# Patient Record
Sex: MALE | Race: WHITE | NOT HISPANIC OR LATINO | Employment: FULL TIME | URBAN - NONMETROPOLITAN AREA
[De-identification: names, ages, dates, MRNs, and addresses within clinical notes are randomized per-mention and may not be internally consistent; named-entity substitution may affect disease eponyms.]

---

## 2017-03-27 ENCOUNTER — COMMUNICATION - GICH (OUTPATIENT)
Dept: INTERNAL MEDICINE | Facility: OTHER | Age: 47
End: 2017-03-27

## 2017-03-27 DIAGNOSIS — E03.9 HYPOTHYROIDISM: ICD-10-CM

## 2017-04-17 ENCOUNTER — OFFICE VISIT - GICH (OUTPATIENT)
Dept: INTERNAL MEDICINE | Facility: OTHER | Age: 47
End: 2017-04-17

## 2017-04-17 ENCOUNTER — HISTORY (OUTPATIENT)
Dept: INTERNAL MEDICINE | Facility: OTHER | Age: 47
End: 2017-04-17

## 2017-04-17 DIAGNOSIS — E78.5 HYPERLIPIDEMIA: ICD-10-CM

## 2017-04-17 DIAGNOSIS — M62.9 DISORDER OF MUSCLE: ICD-10-CM

## 2017-04-17 DIAGNOSIS — M25.561 PAIN IN RIGHT KNEE: ICD-10-CM

## 2017-04-17 DIAGNOSIS — R79.89 OTHER SPECIFIED ABNORMAL FINDINGS OF BLOOD CHEMISTRY: ICD-10-CM

## 2017-04-17 DIAGNOSIS — E03.9 HYPOTHYROIDISM: ICD-10-CM

## 2017-04-17 DIAGNOSIS — E78.2 MIXED HYPERLIPIDEMIA: ICD-10-CM

## 2017-04-17 DIAGNOSIS — Z82.49 FAMILY HISTORY OF ISCHEMIC HEART DISEASE AND OTHER DISEASES OF THE CIRCULATORY SYSTEM: ICD-10-CM

## 2017-04-17 DIAGNOSIS — R35.1 NOCTURIA: ICD-10-CM

## 2017-04-17 DIAGNOSIS — R73.09 OTHER ABNORMAL GLUCOSE: ICD-10-CM

## 2017-04-17 DIAGNOSIS — Z00.00 ENCOUNTER FOR GENERAL ADULT MEDICAL EXAMINATION WITHOUT ABNORMAL FINDINGS: ICD-10-CM

## 2017-04-17 DIAGNOSIS — E03.4 ACQUIRED ATROPHY OF THYROID: ICD-10-CM

## 2017-04-17 LAB
A/G RATIO - HISTORICAL: 2.1 (ref 1–2)
ALBUMIN SERPL-MCNC: 4.6 G/DL (ref 3.5–5.7)
ALP SERPL-CCNC: 48 IU/L (ref 34–104)
ALT (SGPT) - HISTORICAL: 27 IU/L (ref 7–52)
ANION GAP - HISTORICAL: 6 (ref 5–18)
AST SERPL-CCNC: 22 IU/L (ref 13–39)
BILIRUB SERPL-MCNC: 0.6 MG/DL (ref 0.3–1)
BUN SERPL-MCNC: 15 MG/DL (ref 7–25)
BUN/CREAT RATIO - HISTORICAL: 16
CALCIUM SERPL-MCNC: 9.3 MG/DL (ref 8.6–10.3)
CHLORIDE SERPLBLD-SCNC: 103 MMOL/L (ref 98–107)
CHOL/HDL RATIO - HISTORICAL: 6.88
CHOLESTEROL TOTAL: 227 MG/DL
CO2 SERPL-SCNC: 30 MMOL/L (ref 21–31)
CREAT SERPL-MCNC: 0.96 MG/DL (ref 0.7–1.3)
CRP-ULTRASENSITIVE: 0.78 MG/L (ref 0.2–160)
ERYTHROCYTE [DISTWIDTH] IN BLOOD BY AUTOMATED COUNT: 11.1 % (ref 11.5–15.5)
ESTIMATED AVERAGE GLUCOSE: 103 MG/DL
GFR IF NOT AFRICAN AMERICAN - HISTORICAL: >60 ML/MIN/1.73M2
GLOBULIN - HISTORICAL: 2.2 G/DL (ref 2–3.7)
GLUCOSE SERPL-MCNC: 106 MG/DL (ref 70–105)
HCT VFR BLD AUTO: 48.2 % (ref 37–53)
HDLC SERPL-MCNC: 33 MG/DL (ref 23–92)
HEMOGLOBIN A1C MONITORING (POCT) - HISTORICAL: 5.2 % (ref 4–6.2)
HEMOGLOBIN: 16.3 G/DL (ref 13.5–17.5)
LDLC SERPL CALC-MCNC: 146 MG/DL
MCH RBC QN AUTO: 31.3 PG (ref 26–34)
MCHC RBC AUTO-ENTMCNC: 33.8 G/DL (ref 32–36)
MCV RBC AUTO: 93 FL (ref 80–100)
NON-HDL CHOLESTEROL - HISTORICAL: 194 MG/DL
PATIENT STATUS - HISTORICAL: ABNORMAL
PLATELET # BLD AUTO: 185 THOU/CU MM (ref 140–440)
PMV BLD: 11.1 FL (ref 6.5–11)
POTASSIUM SERPL-SCNC: 4.4 MMOL/L (ref 3.5–5.1)
PROT SERPL-MCNC: 6.8 G/DL (ref 6.4–8.9)
PSA TOTAL (DIAGNOSTIC) - HISTORICAL: 1.1 NG/ML
RED BLOOD COUNT - HISTORICAL: 5.21 MIL/CU MM (ref 4.3–5.9)
SODIUM SERPL-SCNC: 139 MMOL/L (ref 133–143)
TRIGL SERPL-MCNC: 241 MG/DL
TSH - HISTORICAL: 2.73 UIU/ML (ref 0.34–5.6)
VITAMIN D TOTAL - HISTORICAL: 69.1 NG/ML
WHITE BLOOD COUNT - HISTORICAL: 4.7 THOU/CU MM (ref 4.5–11)

## 2017-04-17 ASSESSMENT — PATIENT HEALTH QUESTIONNAIRE - PHQ9: SUM OF ALL RESPONSES TO PHQ QUESTIONS 1-9: 0

## 2017-04-27 ENCOUNTER — AMBULATORY - GICH (OUTPATIENT)
Dept: ORTHOPEDICS | Facility: OTHER | Age: 47
End: 2017-04-27

## 2017-04-27 DIAGNOSIS — M25.561 PAIN IN RIGHT KNEE: ICD-10-CM

## 2017-05-01 ENCOUNTER — OFFICE VISIT - GICH (OUTPATIENT)
Dept: FAMILY MEDICINE | Facility: OTHER | Age: 47
End: 2017-05-01

## 2017-05-01 ENCOUNTER — HOSPITAL ENCOUNTER (OUTPATIENT)
Dept: RADIOLOGY | Facility: OTHER | Age: 47
End: 2017-05-01
Attending: ORTHOPAEDIC SURGERY

## 2017-05-01 ENCOUNTER — HISTORY (OUTPATIENT)
Dept: FAMILY MEDICINE | Facility: OTHER | Age: 47
End: 2017-05-01

## 2017-05-01 ENCOUNTER — OFFICE VISIT - GICH (OUTPATIENT)
Dept: ORTHOPEDICS | Facility: OTHER | Age: 47
End: 2017-05-01

## 2017-05-01 DIAGNOSIS — M25.561 PAIN IN RIGHT KNEE: ICD-10-CM

## 2017-05-01 DIAGNOSIS — Z00.00 ENCOUNTER FOR GENERAL ADULT MEDICAL EXAMINATION WITHOUT ABNORMAL FINDINGS: ICD-10-CM

## 2017-05-01 LAB
BACTERIA URINE: NORMAL BACTERIA/HPF
BILIRUB UR QL: NEGATIVE
CLARITY, URINE: CLEAR CLARITY
COLOR UR: YELLOW COLOR
EPITHELIAL CELLS: NORMAL EPI/HPF
GLUCOSE URINE: NEGATIVE MG/DL
KETONES UR QL: NEGATIVE MG/DL
LEUKOCYTE ESTERASE URINE: NEGATIVE
NITRITE UR QL STRIP: NEGATIVE
OCCULT BLOOD,URINE - HISTORICAL: ABNORMAL
PH UR: 6 [PH]
PROTEIN QUALITATIVE,URINE - HISTORICAL: NEGATIVE MG/DL
RBC - HISTORICAL: NORMAL /HPF
SP GR UR STRIP: 1.01
UROBILINOGEN,QUALITATIVE - HISTORICAL: NORMAL EU/DL
WBC - HISTORICAL: NORMAL /HPF

## 2017-05-01 ASSESSMENT — PATIENT HEALTH QUESTIONNAIRE - PHQ9: SUM OF ALL RESPONSES TO PHQ QUESTIONS 1-9: 0

## 2018-01-04 NOTE — NURSING NOTE
Patient Information     Patient Name MRN Sex Candido Clark [id number] Male 1970      Nursing Note by Melissa Ware at 2017  8:45 AM     Author:  Melissa Ware Service:  (none) Author Type:  (none)     Filed:  2017  8:41 AM Encounter Date:  2017 Status:  Signed     :  Melissa Ware            Pt presents for a consult on his right knee pain. Referred by Dr. You.    Melissa Ware CMA 2017 8:41 AM

## 2018-01-04 NOTE — NURSING NOTE
Patient Information     Patient Name MRN Candido Domínguez [id number] Male 1970      Nursing Note by Zina Dash at 2017  7:50 AM     Author:  Zina Dash Service:  (none) Author Type:  (none)     Filed:  2017  8:00 AM Encounter Date:  2017 Status:  Signed     :  Zina Dash            Patient presents to the clinic for medication management.      Zina Dash LPN        2017 7:48 AM

## 2018-01-04 NOTE — TELEPHONE ENCOUNTER
Patient Information     Patient Name MRN Candido Domínguez [id number] Male 1970      Telephone Encounter by Charo Coronado RN at 3/28/2017 12:09 PM     Author:  Charo Coroando RN Service:  (none) Author Type:  NURS- Registered Nurse     Filed:  3/28/2017 12:13 PM Encounter Date:  3/27/2017 Status:  Signed     :  Charo Coronado RN (NURS- Registered Nurse)            Hypothyroidism    Office visit in the past 12 months or per provider note.    Last visit with JUAN NATHAN was on: 2015 in [facility]  Next visit with JUAN NATHAN is on: No future appointment listed with this provider  Next visit with Internal Medicine is on: No future appointment listed in this department    Lab testing requirements:  TSH annually  TSH (uIU/mL)    Date Value   2015 3.85       Max refill for 12 months from last office visit or per provider note.  Informed patient due for annual review and patient transferred to scheduling.  Short refill given until appointment.    Prescription refilled per RN Medication Refill Policy.................... CHARO CORONADO RN ....................  3/28/2017   12:13 PM

## 2018-01-04 NOTE — NURSING NOTE
Patient Information     Patient Name MRN Candido Domínguez [id number] Male 1970      Nursing Note by Florence Canseco at 2017  7:45 AM     Author:  Florence Canseco Service:  (none) Author Type:  (none)     Filed:  2017  7:54 AM Encounter Date:  2017 Status:  Signed     :  Florence Canseco            Patient here for DOT physical, passed color vision and Visual Acuity Screening - Snellen Chart   Visual acuity OD (right eye): 20/ 25   Visual acuity OS (left eye): 20/ 25   Visual acuity OU (both eyes): 20/ 20   Corrective lenses worn: Carmen Canseco LPN .......................2017  7:50 AM

## 2018-01-04 NOTE — ADDENDUM NOTE
Patient Information     Patient Name MRN Candido Domínguez [id number] Male 1970      Addendum Note by Juan Nathan MD at 3/28/2017 12:22 PM     Author:  Juan Nathan MD Service:  (none) Author Type:  Physician     Filed:  3/28/2017 12:22 PM Encounter Date:  3/27/2017 Status:  Signed     :  Juan Nathan MD (Physician)       Addended by: JUAN NATHAN on: 3/28/2017 12:22 PM        Modules accepted: Orders

## 2018-01-04 NOTE — PROGRESS NOTES
"Patient Information     Patient Name MRN Sex Candido Clark [id number] Male 1970      Progress Notes by Fredy Scales MD at 2017  7:45 AM     Author:  Fredy Scales MD Service:  (none) Author Type:  Physician     Filed:  2017 12:45 PM Encounter Date:  2017 Status:  Signed     :  Fredy Scales MD (Physician)            SUBJECTIVE:    Candido Almeida is a 46 y.o. male who presents for DOT physical    HPI Comments: Patient arrives here for a DOT physical. Currently does not offer any complaints or concerns.      No Known Allergies and   Family History       Problem   Relation Age of Onset     Genetic  Other      Hypercholesterolemia, No FHx of DM, CVA, Cancers       Cancer-colon  Other      No FHx         REVIEW OF SYSTEMS:  ROS    OBJECTIVE:  /80  Pulse 64  Ht 1.753 m (5' 9\")  Wt 83.9 kg (185 lb)  BMI 27.32 kg/m2    EXAM:   Physical Exam   Constitutional: He is well-developed, well-nourished, and in no distress.   HENT:   Head: Normocephalic and atraumatic.   Right Ear: External ear normal.   Left Ear: External ear normal.   Cardiovascular: Normal rate, regular rhythm and normal heart sounds.    No murmur heard.  Pulmonary/Chest: Effort normal and breath sounds normal.   Genitourinary: Penis normal.   Musculoskeletal: Normal range of motion.   Neurological: He is alert.   Psychiatric: Affect normal.     Results for orders placed or performed in visit on 17       URINALYSIS W REFLEX MICROSCOPIC IF POSITIVE       Result  Value Ref Range Status    COLOR                     Yellow Yellow Color Final    CLARITY                   Clear Clear Clarity Final    SPECIFIC GRAVITY,URINE    1.010 1.010, 1.015, 1.020, 1.025                 Final    PH,URINE                  6.0 6.0, 7.0, 8.0, 5.5, 6.5, 7.5, 8.5                 Final    UROBILINOGEN,QUALITATIVE  Normal Normal EU/dl Final    PROTEIN, URINE Negative Negative mg/dL Final    GLUCOSE, URINE Negative Negative " mg/dL Final    KETONES,URINE             Negative Negative mg/dL Final    BILIRUBIN,URINE           Negative Negative                 Final    OCCULT BLOOD,URINE        Trace (A) Negative                 Final    NITRITE                   Negative Negative                 Final    LEUKOCYTE ESTERASE        Negative Negative                 Final   URINALYSIS MICROSCOPIC       Result  Value Ref Range Status    RBC 0-2 0-2, None Seen /HPF Final    WBC None Seen 0-2, 3-5, None Seen /HPF Final    BACTERIA                  None Seen None Seen, Rare, Occasional, Few Bacteria/HPF Final    EPITHELIAL CELLS          None Seen None Seen, Few Epi/HPF Final       ASSESSMENT/PLAN:    ICD-10-CM    1. Preventative health care Z00.00 URINALYSIS W REFLEX MICROSCOPIC IF POSITIVE      URINALYSIS W REFLEX MICROSCOPIC IF POSITIVE      URINALYSIS MICROSCOPIC      URINALYSIS MICROSCOPIC        Plan:  Satisfactory exam. 2 years certificate given.

## 2018-01-04 NOTE — PATIENT INSTRUCTIONS
Patient Information     Patient Name MRN Candido Domínguez [id number] Male 1970      Patient Instructions by Prieto You MD at 2017  7:50 AM     Author:  Prieto You MD  Service:  (none) Author Type:  Physician     Filed:  2017  8:55 AM  Encounter Date:  2017 Status:  Addendum     :  Prieto You MD (Physician)        Related Notes: Original Note by Prieto You MD (Physician) filed at 2017  8:21 AM            Off Pravastatin for about 1 month.     Hx of Statin use for a number of years.     Recheck labs today.     See printed information on hamstrings exercise.    Due to right knee pain...  Orthopedic referral sent  - they will call with date/time of appointment.      Call [phone] or [phone]  Otherwise -- [phone]  - or -  [phone]     -- To schedule an appointment with the orthopedic clinic:   -- Dr. Bueno, Dr. Ruffin    Schedule lab only appointment in the next 3-5 months.  Recheck cholesterol levels.    Depending on TSH levels, may need to adjust your Synthroid dose.    Return in approximately 1 year, or sooner as needed for follow-up with Dr. You.  -- Physical in 1 year, lab-only check in about 4 months.    Clinic : [phone]  Appointment line: [phone]        AHA 2013 Pooled Cohort Guidelines for ASCVD Risk Reduction     Cardiovascular risk analysis - LDL goal is under 100, nondiabetic, no existing CAD, no hypertension, nonsmoker, ++ FH premature CAD, nonobese, moderate active lifestyle, no prior stroke/TIA    The 10-year ASCVD risk score (Keenanprosper ROBB Jr, et al., 2013) is: 3.7%    Values used to calculate the score:      Age: 46 years      Sex: Male      Is Non- : No      Diabetic: No      Tobacco smoker: No      Systolic Blood Pressure: 120 mmHg      Is BP treated: No      HDL Cholesterol: 29 mg/dL      Total Cholesterol: 171 mg/dL    After discussion with Candido regarding his 10  year ASCVD risk of 3.7% plus Early Family History, my clinical recommendations are to initiate moderate-intensity statin.    Candido is also recommended to eat a heart-healthy diet, do regular aerobic exercises, maintain a desirable body weight, and avoid tobacco products. These recommendations are from the American Heart Association (AHA) which stresses the importance of lifestyle changes to lower cardiovascular disease risk.    Last Lipids:  Chol: 171    2015  T    2015  HDL:   29    2015  LDL:  112    2015    Your estimated cardiovascular event risk is noted above, as calculated by the risk calculator.     High-Intensity Statin  - Daily dose lowers LDL-C, on average by approximately ?50%    Atorvastatin 40*-80 mg  Rosuvastatin 20-(40) mg    Moderate-Intensity Statin  - Daily dose lowers LDL-C, on average by approximately 30% to <50%    Atorvastatin 10-(20) mg  Fluvastatin 40 mg bid  Fluvastatin XL 80 mg  Lovastatin 40 mg  Pitavastatin 2-4 mg  Pravastatin 40-(80) mg  Rosuvastatin (5)-10 mg  Simvastatin 20-40 mg**    Low-Intensity Statin  - Daily dose lowers LDL-C, on average by approximately <30%    Fluvastatin 20-40 mg  Lovastatin 20 mg  Simvastatin 10 mg  Pitavastatin 1 mg  Pravastatin 10-20 mg    Here is some additional information, if this helps you decide on STATIN therapy or dose adjustment of your statin medication.     Categories:   group 1 -- includes individuals with established  heart disease; if <75 yr of age, high-intensity statin therapy indicated;  high-intensity statin therapy consists only of atorvastatin  40-80 mg or rosuvastatin 20-40 mg; goal 50% reduction in lowdensity  lipoprotein cholesterol (LDL-C);     group 2 -- includes individuals with familial hyperlipidemia; high-intensity statin  therapy indicated; if >75 yr of age, moderate-intensity statin  therapy acceptable;     group 3 -- includes diabetics; for diabetics  with vascular disease, high-intensity  statin therapy indicated;  if >40 yr of age, moderate-intensity statin therapy indicated;    group 4 -- includes individuals without disease but at risk for  disease; use pooled risk calculator to determine risk over next  10 yr; if risk >7.5%, moderate- to high-intensity statin therapy  indicated; all patients -- therapeutic lifestyle changes (ie, diet,  weight loss, exercise) indicated    --- If risk is 5 to 7.5%, consider moderate intensity statin if: LDL > 160 mg/dl, family history, hs CRP > 2, CAC >300 or 75% of estimated for age, LORRIE < 0.9, or high lifetime risk.     History of guidelines: [place] Simvastatin Survival Study  (4S), Long-Term Intervention with Pravastatin in Ischemic  Disease (LIPID) trial, and Cholesterol and Recurrent Events  (CARE) trial found that event reduction paralleled drop in  LDL-C; presently, if patient has heart disease, high-intensity  statin therapy indicated    Basis for recommendation of high-intensity statin therapy: angiographic  data;   - Reversal of Atherosclerosis with Lipitor (REVERSAL) trial -- found that LDL-C lowered to ?100 mg/dL in patients on pravastatin 40 mg, whereas LDL-C lowered to  80 mg/dL in patients on atorvastatin 80 mg; at 18 mo, shrinkage and stabilization of plaque and enlargement of lumen observed with atorvastatin    - Study to Evaluate the Effect of Rosuvastatin on Intravascular Ultrasound-Derived Coronary Atheroma [place] (ASTEROID) -- found LDL-C lowered to 60 mg/dL after 24 mo, with change in volume and even some regression of atheroma observed;     Treating to New Targets (RAIN) study -- patients with coronary heart disease randomized to:   atorvastatin 10 mg (moderate intensity) vs atorvastatin 80 mg (high intensity); study found that lowered LDL-C associated with event reduction of 22%; in all previous studies, cardiovascular (CV) deaths more prevalent than non-CV deaths,  but in RAIN study, more non-CV deaths than CV deaths seen;  study showed  benefit of high-intensity statin therapy in patients  with coronary heart disease    Statin therapy in primary prevention: [place] Coronary  Prevention Study (WOSCOPS) -- looked at middle-aged  men (average age 55 yr); pravastatin 40 mg associated with  event reduction of ?30% at 5 yr; 1995 follow-up study found  20-yr mortality reduced by 27%, all-cause mortality reduced  by 13%, need for revascularization reduced by 19%, and heart  failure reduced by 31% (with no effect on stroke); even lowto  moderate-intensity statin therapy has mortality benefit over  long term; [employer] Coronary Atherosclerosis Prevention  Study (AFCAPS/TexCAPS) -- found that lovastatin effective in  primary prevention; moderate intensity statin therapy appears to  be effective in primary prevention; Justification for the Use of  Statins in Primary Prevention: An Intervention Trial Evaluating  Rosuvastatin (JAMES) -- trial found that rosuvastatin 20  mg reduced CV risk by 44% compared with placebo; LDL-C  decreased by 50%; high-density lipoprotein cholesterol (HDLC)  increased slightly, and C-reactive protein decreased by onethird;  mortality improved by 20%; number needed to treat 25  (cost-effective); rate of diabetes increased slightly (by 25%);  diabetics and individuals with history of stent or bypass should  receive high-intensity statin therapy.     After discussion of the treatment of hyperlipidemia, a statin-drug is chosen; prescription for Pravachol (pravastatin), grapefruit juice is OK to take with Pravachol once daily is written.     The patient is informed that this type of drug is usually highly effective to lower LDL cholesterol and is usually very well tolerated. However, statin-type drugs can potentially injure the liver. Blood tests will be required every 3 months for the first 6 months of therapy, and at 6-12 month intervals thereafter.  Damage to the liver, if detected early, can be reversed by stopping  the drug.  Be alert for persistent nausea, abdominal pain, or yellow jaundice, and report such to me directly. Statin drugs may also cause skeletal muscle injury in rare cases. Be alert for pronounced persistent diffuse muscle pain and discontinue the drug immediately should such symptoms develop.     Statins can cause myalgias or muscle aches, it is rare to have severe muscle aches --- mild aches/pains are more common.  -- Approximately 70% of the country has low vitamin D levels.  -- If you develop aches and pains in the muscles after starting Statin medications; Recommend discontinuation of the statin temporarily, start taking Vitamin D3 - 2000 up to 5000 units daily for 2-4 weeks, then restart the Statin medication, but just 2 or 3 times weekly - increase statin to every evening if tolerated.    Nearly 70% of people are able to tolerate statin medications, once their Vitamin D levels are normalized.    - Coenzyme Q10 (CO Q-10) 200 to 500 mg capsule (400 to 500 mg daily)    - All-cause cardiovascular mortality was lower, in people taking this medication while on cholesterol medication.     - May also help improve or prevent muscle aches/myalgias from Statin medications.

## 2018-01-04 NOTE — PROGRESS NOTES
Patient Information     Patient Name MRN Candido Domínguez [id number] Male 1970      Progress Notes by Prieto You MD at 2017  7:50 AM     Author:  Prieto You MD Service:  (none) Author Type:  Physician     Filed:  2017 12:54 PM Encounter Date:  2017 Status:  Signed     :  Prieto You MD (Physician)            Nursing Notes:   Zina Dash  2017  8:00 AM  Signed  Patient presents to the clinic for medication management.      Zina Dash LPN        2017 7:48 AM    Candido Almeida presents to clinic today for:   Chief Complaint    Patient presents with      Medication Management     HPI: Mr. Almeida is a 46 y.o. male who presents today for evaluation of above.     (Z00.00) Annual physical exam  (primary encounter diagnosis)  (E78.2) Mixed dyslipidemia  (Z82.49) Family history of heart disease in male family member before age 55  (E03.4) Hypothyroidism due to acquired atrophy of thyroid  (R73.09) Elevated random blood glucose level  (R35.1) Nocturia  (M25.561) Right medial knee pain  (M62.9) Hamstring tightness of left lower extremity  (E03.9) Hypothyroidism, unspecified type  (R79.89) Low serum vitamin D  (E78.5) Dyslipidemia     Family history of early coronary artery disease in males.  Patient has subsequently been on simvastatin and then pravastatin for a number of years.  States that he was starting to get some myalgias and he stopped his pravastatin about 1 month ago after some pressure from his wife.  We talked at length today about heart attack risk reduction, patient's individual risk, and the inability to actually adequately calculate his risk given his family history of heart disease and how the calculator is available do not take this into consideration.  -- Lipid panel shows that his cholesterol numbers have shot up markedly.  He previously had very high levels back in 2009.  Total cholesterol was 258, triglyceride 249, HDL 34, LDL  was 174.  His numbers got down as good as total cholesterol of 138, triglyceride 219, HDL 33, LDL 61 as of 8/25/2014.  -- Recommend restart pravastatin 40 mg daily.  Vitamin D level is replaced adequately at this time.    Check labs today, patient was work on diet, exercise, weight loss during the spring and summer and recheck in the fall.    Patient has been off pravastatin about 1 month.    Hyperthyroidism, currently on oral replacement.  Last TSH had gone up slightly.  Recheck labs today.    Previously elevated random blood glucose level with prediabetes a number of years ago.  Recheck A1c.    Nocturia, states sometimes some once nightly.  Check PSA.  Consider urinalysis if changes in his urinary symptoms develop.    Right knee pain also has a right hamstring tightness.  Knee pain is medial.  States that grinding or twisting or turning his knee will cause worsened pain over the medial aspect.  There is a local area of tenderness along the joint line medially as well.  Advise concerned about possible meniscal injury given the grinding and twisting pain that is noted.  He also states he has difficulty getting up from a kneeling position.  He denies knee locking or catching on him however.  Orthopedic referral sent.    Hypothyroidism, see above.    Low vitamin D, works out in the woods all winter long but has winter clothing on.  He does take some multivitamins and thinks he has some vitamin D in those pressure.  Adequate levels.  Check labs today.    Preventative Health:  Wears seat belts.   Health screenings are up to date.   Immunizations are up to date.   Immunization History     Administered  Date(s) Administered     Influenza Virus, Unspecified 10/29/2010, 11/27/2016     Influenza, IIV4 (Age >= 3 Years) 12/16/2014, 12/01/2015     Tdap 04/04/2013     Advised to try obtain/maintain healthy weight, weight loss, healthy diet and regular exercise.       Mr. Almeida's Body mass index is 28.06 kg/(m^2). This is out  of the normal range for a 46 y.o. Normal range for ages 18-64 is between 18.5 and 24.9; normal range for ages 65+ is 23-30. To lose weight we reviewed risks and benefits of appropriate options such as diet, exercise, and medications. Patient's strategy will be  self-directed nutrition plan and self-directed exercise program   BP Readings from Last 1 Encounters:04/17/17 : 120/76  Mr. Michel blood pressure is out of the normal range for adults. Per JNC-8 guidelines normal adult blood pressure is < 120/80, pre-hypertensive is between 120/80 and 139/89, and hypertension is 140/90 or greater. Risks of hypertension were discussed. Patient's strategy will be weight loss, increased activity and reduced salt intake    Functional Capacity: > 4 METS.   Reports that he can climb a flight of stairs without any chest pain/heaviness or shortness of breath.   Patient reports no current symptoms of fevers, chills, nausea/vomiting.   No cough. No shortness of breath.   No change in bowel/bladder habits. No melena, hematochezia. No Hematuria.   No rashes. No palpitations.  No orthopnea/paroxysmal nocturnal dyspnea   No vision or hearing issues.   No significant mood issues   No bruising.     NAJMA:  No flowsheet data found.    PHQ9:  PHQ Depression Screening 12/1/2015 4/17/2017   Date of PHQ exam (doc flow) 12/1/2015 4/17/2017   1. Lack of interest/pleasure 0 - Not at all 0 - Not at all   2. Feeling down/depressed 0 - Not at all 0 - Not at all   PHQ-2 TOTAL SCORE 0 0   3. Trouble sleeping 1 - Several days 0 - Not at all   4. Decreased energy 1 - Several days 0 - Not at all   5. Appetite change 0 - Not at all 0 - Not at all   6. Feelings of failure 0 - Not at all 0 - Not at all   7. Trouble concentrating 0 - Not at all 0 - Not at all   8. Activity level 0 - Not at all 0 - Not at all   9. Hurting yourself 0 - Not at all 0 - Not at all   PHQ-9 TOTAL SCORE 2 0   PHQ-9 Severity Level none none   Functional Impairment not difficult at all  not difficult at all        I have personally reviewed the past medical history, past surgical history, medications, allergies, family and social history as listed below, on 4/17/2017.    Patient Active Problem List      Diagnosis Date Noted     Right medial knee pain 04/17/2017     Hamstring tightness of left lower extremity 04/17/2017     Hypothyroidism due to acquired atrophy of thyroid 12/01/2015     Health care maintenance 11/23/2015     Family history of heart disease in male family member before age 55 08/25/2014     Nummular dermatitis - left chest 08/25/2014     Health examination of defined subpopulation 05/23/2013     Rash 05/23/2013     Mixed dyslipidemia 05/23/2013     Past Medical History:     Diagnosis  Date     Hyperlipidemia 5/23/2013     HYPOTHYROIDISM      Past Surgical History:      Procedure  Laterality Date     VASECTOMY      Vasectomy       Current Outpatient Prescriptions       Medication  Sig Dispense Refill     doxycycline (VIBRAMYCIN) 100 mg tablet Take 2 tablets following tick bite 10 tablet 0     levothyroxine (SYNTHROID) 112 mcg tablet Take 1 tablet by mouth before breakfast. 90 tablet 3     pravastatin (PRAVACHOL) 40 mg tablet Take 1 tablet by mouth once daily. - For heart attack risk reduction - Restart as of 4/17/2017 - likely familial hyperlipidemia 90 tablet 3     No Known Allergies  Family History       Problem   Relation Age of Onset     Genetic  Other      Hypercholesterolemia, No FHx of DM, CVA, Cancers       Cancer-colon  Other      No FHx       Family Status     Relation  Status     Mother Alive    Low thyroid      Father Alive    CAD x2 -- 1st stent at age 50, Hyperlipidemia      Sister Alive    Hyperlipidemia, Low thyroid      Sister Alive     Other      Other      Social History     Social History        Marital status:       Spouse name: Lyn     Number of children:  2     Years of education:  N/A     Occupational History        [employer]  "History Main Topics        Smoking status:  Never Smoker     Smokeless tobacco:  Current User     Alcohol use  0.0 oz/week     0 Standard drinks or equivalent per week      Drug use:  No     Sexual activity:  Not on file     Other Topics  Concern     Not on file      Social History Narrative     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     Son and daughter.     Wife Lyn.            Complete ROS was performed and was negative unless otherwise noted in HPI above.     EXAM:   Vitals:     04/17/17 0749   BP: 120/76   Pulse: 76   Temp: 96  F (35.6  C)   TempSrc: Tympanic   Weight: 86.2 kg (190 lb)   Height: 1.778 m (5' 10\")     BP Readings from Last 3 Encounters:    04/17/17 120/76   12/01/15 130/90   05/21/15 120/80     Wt Readings from Last 3 Encounters:    04/17/17 86.2 kg (190 lb)   12/01/15 86 kg (189 lb 9.6 oz)   05/21/15 85.3 kg (188 lb)     Estimated body mass index is 27.26 kg/(m^2) as calculated from the following:    Height as of this encounter: 1.778 m (5' 10\").    Weight as of this encounter: 86.2 kg (190 lb).     EXAM:  Constitutional: Pleasant, alert, appropriate appearance for age. No acute distress  ENT: Normocephalic, Atraumatic, Thyroid without nodules or tenderness   Nose/Mouth: Oral pharynx without erythema or exudates, Nose is patent bilaterally, no rhinorrhea, Dental hygeine adequate and Dentition is intact   Eyes:  Extraocular muscles intact, Sclera non-icteric, Conjunctiva without erythema  Lymphatic Exam: Non-palpable nodes in neck, clavicular, axillary, or inguinal regions.  Pulmonary: Lungs are clear to auscultation bilaterally, without wheezes or crackles  Cardiovascular Exam: regular rate and rhythm, brisk carotid upstroke without bruits, peripheral pulses very brisk, no pedal edema, no JVD, no murmur, click, rub or gallop appreciated  Gastrointestinal Exam: Soft, non-tender, non-distended, positive bowel sounds  Integument: No abnormal rashes, sores, or ulcerations noted  Neurologic Exam: CN 3-12 " grossly intact Nonfocal; symmetric DTRs, normal gross motor movement, tone, and coordination. No tremor.  Sensation intact to light touch in upper and lower extremities  Musculoskeletal Exam: Tenderness along the medial aspect of right knee along the joint line.  Hamstring tightness of right thigh noted.  Moves upper and lower extremities symmetrically, No focal weakness  Gait and station appear grossly normal  Psychiatric Exam: Awake and Alert, Affect and mood appropriate  Speech is fluent, Thought process is normal    INVESTIGATIONS:   Results for orders placed or performed in visit on 04/17/17      CBC W PLT NO DIFF      Result  Value Ref Range    WHITE BLOOD COUNT         4.7 4.5 - 11.0 thou/cu mm    RED BLOOD COUNT           5.21 4.30 - 5.90 mil/cu mm    HEMOGLOBIN                16.3 13.5 - 17.5 g/dL    HEMATOCRIT                48.2 37.0 - 53.0 %    MCV                       93 80 - 100 fL    MCH                       31.3 26.0 - 34.0 pg    MCHC                      33.8 32.0 - 36.0 g/dL    RDW                       11.1 (L) 11.5 - 15.5 %    PLATELET COUNT            185 140 - 440 thou/cu mm    MPV                       11.1 (H) 6.5 - 11.0 fL   COMP METABOLIC PANEL      Result  Value Ref Range    SODIUM 139 133 - 143 mmol/L    POTASSIUM 4.4 3.5 - 5.1 mmol/L    CHLORIDE 103 98 - 107 mmol/L    CO2,TOTAL 30 21 - 31 mmol/L    ANION GAP 6 5 - 18                    GLUCOSE 106 (H) 70 - 105 mg/dL    CALCIUM 9.3 8.6 - 10.3 mg/dL    BUN 15 7 - 25 mg/dL    CREATININE 0.96 0.70 - 1.30 mg/dL    BUN/CREAT RATIO           16                    GFR if African American >60 >60 ml/min/1.73m2    GFR if not African American >60 >60 ml/min/1.73m2    ALBUMIN 4.6 3.5 - 5.7 g/dL    PROTEIN,TOTAL 6.8 6.4 - 8.9 g/dL    GLOBULIN                  2.2 2.0 - 3.7 g/dL    A/G RATIO 2.1 (H) 1.0 - 2.0                    BILIRUBIN,TOTAL 0.6 0.3 - 1.0 mg/dL    ALK PHOSPHATASE 48 34 - 104 IU/L    ALT (SGPT) 27 7 - 52 IU/L    AST (SGOT) 22 13 -  39 IU/L   HEMOGLOBIN A1C MONITORING (POCT)      Result  Value Ref Range    HEMOGLOBIN A1C MONITORING (POCT) 5.2 4.0 - 6.2 %    ESTIMATED AVERAGE GLUCOSE  103 mg/dL   LIPID PANEL      Result  Value Ref Range    CHOLESTEROL,TOTAL 227 (H) <200 mg/dL    TRIGLYCERIDES 241 (H) <150 mg/dL    HDL CHOLESTEROL 33 23 - 92 mg/dL    NON-HDL CHOLESTEROL 194 (H) <145 mg/dl    CHOL/HDL RATIO            6.88 (H) <4.50                    LDL CHOLESTEROL 146 (H) <100 mg/dL    PATIENT STATUS            FASTING                   PSA, TOTAL      Result  Value Ref Range    PSA TOTAL (DIAGNOSTIC) 1.099 <=3.100 ng/mL   HIGH SENSITIVITY-CARD-CRP      Result  Value Ref Range    HS CRP 0.775 0.200 - 160.000 mg/L   TSH      Result  Value Ref Range    TSH 2.73 0.34 - 5.60 uIU/mL   VITAMIN D 25 (DEFICIENCY)      Result  Value Ref Range    VITAMIN D TOTAL AFL 69.1   ng/mL       ASSESSMENT AND PLAN:  Candido was seen today for medication management.    Diagnoses and all orders for this visit:    Annual physical exam    Mixed dyslipidemia  -     CBC W PLT NO DIFF; Standing  -     COMP METABOLIC PANEL; Standing  -     LIPID PANEL; Standing  -     HIGH SENSITIVITY-CARD-CRP; Future  -     CBC W PLT NO DIFF  -     COMP METABOLIC PANEL  -     LIPID PANEL  -     HIGH SENSITIVITY-CARD-CRP    Family history of heart disease in male family member before age 55  -     HIGH SENSITIVITY-CARD-CRP; Future  -     HIGH SENSITIVITY-CARD-CRP    Hypothyroidism due to acquired atrophy of thyroid  -     TSH; Future  -     TSH    Elevated random blood glucose level  -     HEMOGLOBIN A1C MONITORING (POCT); Standing  -     HEMOGLOBIN A1C MONITORING (POCT)    Nocturia  -     PSA, TOTAL; Future  -     PSA, TOTAL    Right medial knee pain  -     AMB CONSULT TO ORTHOPEDICS - AFFILIATE ONLY; Future    Hamstring tightness of left lower extremity    Hypothyroidism, unspecified type  -     levothyroxine (SYNTHROID) 112 mcg tablet; Take 1 tablet by mouth before breakfast.    Low  serum vitamin D  -     VITAMIN D 25 (DEFICIENCY); Future  -     VITAMIN D 25 (DEFICIENCY)    Dyslipidemia  -     pravastatin (PRAVACHOL) 40 mg tablet; Take 1 tablet by mouth once daily. - For heart attack risk reduction - Restart as of 4/17/2017 - likely familial hyperlipidemia      lab results and schedule of future lab studies reviewed with patient, reviewed diet, exercise and weight control, recommended sodium restriction, cardiovascular risk and specific lipid/LDL goals reviewed    -- Expected clinical course discussed   -- Medications and their side effects discussed    Beyond regular routine physical examination, additional 15 minutes spent in face-to-face interaction with patient (separate from separately billed procedures) with greater than 50% spent in counseling and care coordination of listed medical problems.    -- Regarding patient's coronary artery risk, risk reduction strategies, cholesterol management.    The 10-year ASCVD risk score (Keenan CLARISSE Jr, et al., 2013) is: 4.9%    Values used to calculate the score:      Age: 46 years      Sex: Male      Is Non- : No      Diabetic: No      Tobacco smoker: No      Systolic Blood Pressure: 120 mmHg      Is BP treated: No      HDL Cholesterol: 33 mg/dL      Total Cholesterol: 227 mg/dL    Return in about 1 year (around 4/17/2018) for -- Physical in 1 year, lab-only check in about 4 months..    Patient Instructions   Off Pravastatin for about 1 month.     Hx of Statin use for a number of years.     Recheck labs today.     See printed information on hamstrings exercise.    Due to right knee pain...  Orthopedic referral sent  - they will call with date/time of appointment.      Call [phone] or [phone]  Otherwise -- [phone]  - or -  [phone]     -- To schedule an appointment with the orthopedic clinic:   -- Dr. Bueno, Dr. Ruffin    Schedule lab only appointment in the next 3-5 months.  Recheck cholesterol  levels.    Depending on TSH levels, may need to adjust your Synthroid dose.    Return in approximately 1 year, or sooner as needed for follow-up with Dr. You.  -- Physical in 1 year, lab-only check in about 4 months.    Clinic : [phone]  Appointment line: [phone]        AHA 2013 Pooled Cohort Guidelines for ASCVD Risk Reduction     Cardiovascular risk analysis - LDL goal is under 100, nondiabetic, no existing CAD, no hypertension, nonsmoker, ++ FH premature CAD, nonobese, moderate active lifestyle, no prior stroke/TIA    The 10-year ASCVD risk score (Keenan ROBB Jr, et al., 2013) is: 3.7%    Values used to calculate the score:      Age: 46 years      Sex: Male      Is Non- : No      Diabetic: No      Tobacco smoker: No      Systolic Blood Pressure: 120 mmHg      Is BP treated: No      HDL Cholesterol: 29 mg/dL      Total Cholesterol: 171 mg/dL    After discussion with Candido regarding his 10 year ASCVD risk of 3.7% plus Early Family History, my clinical recommendations are to initiate moderate-intensity statin.    Candido is also recommended to eat a heart-healthy diet, do regular aerobic exercises, maintain a desirable body weight, and avoid tobacco products. These recommendations are from the American Heart Association (AHA) which stresses the importance of lifestyle changes to lower cardiovascular disease risk.    Last Lipids:  Chol: 171    2015  T    2015  HDL:   29    2015  LDL:  112    2015    Your estimated cardiovascular event risk is noted above, as calculated by the risk calculator.     High-Intensity Statin  - Daily dose lowers LDL-C, on average by approximately ?50%    Atorvastatin 40*-80 mg  Rosuvastatin 20-(40) mg    Moderate-Intensity Statin  - Daily dose lowers LDL-C, on average by approximately 30% to <50%    Atorvastatin 10-(20) mg  Fluvastatin 40 mg bid  Fluvastatin XL 80 mg  Lovastatin 40 mg  Pitavastatin 2-4 mg  Pravastatin  40-(80) mg  Rosuvastatin (5)-10 mg  Simvastatin 20-40 mg**    Low-Intensity Statin  - Daily dose lowers LDL-C, on average by approximately <30%    Fluvastatin 20-40 mg  Lovastatin 20 mg  Simvastatin 10 mg  Pitavastatin 1 mg  Pravastatin 10-20 mg    Here is some additional information, if this helps you decide on STATIN therapy or dose adjustment of your statin medication.     Categories:   group 1 -- includes individuals with established  heart disease; if <75 yr of age, high-intensity statin therapy indicated;  high-intensity statin therapy consists only of atorvastatin  40-80 mg or rosuvastatin 20-40 mg; goal 50% reduction in lowdensity  lipoprotein cholesterol (LDL-C);     group 2 -- includes individuals with familial hyperlipidemia; high-intensity statin  therapy indicated; if >75 yr of age, moderate-intensity statin  therapy acceptable;     group 3 -- includes diabetics; for diabetics  with vascular disease, high-intensity statin therapy indicated;  if >40 yr of age, moderate-intensity statin therapy indicated;    group 4 -- includes individuals without disease but at risk for  disease; use pooled risk calculator to determine risk over next  10 yr; if risk >7.5%, moderate- to high-intensity statin therapy  indicated; all patients -- therapeutic lifestyle changes (ie, diet,  weight loss, exercise) indicated    --- If risk is 5 to 7.5%, consider moderate intensity statin if: LDL > 160 mg/dl, family history, hs CRP > 2, CAC >300 or 75% of estimated for age, LORRIE < 0.9, or high lifetime risk.     History of guidelines: [place] Simvastatin Survival Study  (4S), Long-Term Intervention with Pravastatin in Ischemic  Disease (LIPID) trial, and Cholesterol and Recurrent Events  (CARE) trial found that event reduction paralleled drop in  LDL-C; presently, if patient has heart disease, high-intensity  statin therapy indicated    Basis for recommendation of high-intensity statin therapy: angiographic  data;   - Reversal  of Atherosclerosis with Lipitor (REVERSAL) trial -- found that LDL-C lowered to ?100 mg/dL in patients on pravastatin 40 mg, whereas LDL-C lowered to  80 mg/dL in patients on atorvastatin 80 mg; at 18 mo, shrinkage and stabilization of plaque and enlargement of lumen observed with atorvastatin    - Study to Evaluate the Effect of Rosuvastatin on Intravascular Ultrasound-Derived Coronary Atheroma [place] (ASTEROID) -- found LDL-C lowered to 60 mg/dL after 24 mo, with change in volume and even some regression of atheroma observed;     Treating to New Targets (RAIN) study -- patients with coronary heart disease randomized to:   atorvastatin 10 mg (moderate intensity) vs atorvastatin 80 mg (high intensity); study found that lowered LDL-C associated with event reduction of 22%; in all previous studies, cardiovascular (CV) deaths more prevalent than non-CV deaths,  but in RAIN study, more non-CV deaths than CV deaths seen;  study showed benefit of high-intensity statin therapy in patients  with coronary heart disease    Statin therapy in primary prevention: [place] Coronary  Prevention Study (WOSCOPS) -- looked at middle-aged  men (average age 55 yr); pravastatin 40 mg associated with  event reduction of ?30% at 5 yr; 1995 follow-up study found  20-yr mortality reduced by 27%, all-cause mortality reduced  by 13%, need for revascularization reduced by 19%, and heart  failure reduced by 31% (with no effect on stroke); even lowto  moderate-intensity statin therapy has mortality benefit over  long term; [employer] Coronary Atherosclerosis Prevention  Study (AFCAPS/TexCAPS) -- found that lovastatin effective in  primary prevention; moderate intensity statin therapy appears to  be effective in primary prevention; Justification for the Use of  Statins in Primary Prevention: An Intervention Trial Evaluating  Rosuvastatin (JAMES) -- trial found that rosuvastatin 20  mg reduced CV risk by 44% compared with placebo;  LDL-C  decreased by 50%; high-density lipoprotein cholesterol (HDLC)  increased slightly, and C-reactive protein decreased by onethird;  mortality improved by 20%; number needed to treat 25  (cost-effective); rate of diabetes increased slightly (by 25%);  diabetics and individuals with history of stent or bypass should  receive high-intensity statin therapy.     After discussion of the treatment of hyperlipidemia, a statin-drug is chosen; prescription for Pravachol (pravastatin), grapefruit juice is OK to take with Pravachol once daily is written.     The patient is informed that this type of drug is usually highly effective to lower LDL cholesterol and is usually very well tolerated. However, statin-type drugs can potentially injure the liver. Blood tests will be required every 3 months for the first 6 months of therapy, and at 6-12 month intervals thereafter.  Damage to the liver, if detected early, can be reversed by stopping the drug.  Be alert for persistent nausea, abdominal pain, or yellow jaundice, and report such to me directly. Statin drugs may also cause skeletal muscle injury in rare cases. Be alert for pronounced persistent diffuse muscle pain and discontinue the drug immediately should such symptoms develop.     Statins can cause myalgias or muscle aches, it is rare to have severe muscle aches --- mild aches/pains are more common.  -- Approximately 70% of the country has low vitamin D levels.  -- If you develop aches and pains in the muscles after starting Statin medications; Recommend discontinuation of the statin temporarily, start taking Vitamin D3 - 2000 up to 5000 units daily for 2-4 weeks, then restart the Statin medication, but just 2 or 3 times weekly - increase statin to every evening if tolerated.    Nearly 70% of people are able to tolerate statin medications, once their Vitamin D levels are normalized.    - Coenzyme Q10 (CO Q-10) 200 to 500 mg capsule (400 to 500 mg daily)    - All-cause  cardiovascular mortality was lower, in people taking this medication while on cholesterol medication.     - May also help improve or prevent muscle aches/myalgias from Statin medications.      Prieto You MD

## 2018-01-04 NOTE — PROGRESS NOTES
Patient Information     Patient Name MRN Sex Candido Clark [id number] Male 1970      Progress Notes by Dayne Ruffin DO at 2017  8:45 AM     Author:  Dayne Ruffin DO Service:  (none) Author Type:  PHYS- Osteopathic     Filed:  2017 10:01 AM Encounter Date:  2017 Status:  Signed     :  Dayne Ruffin DO (PHYS- Osteopathic)            Candido Almeida was seen in consultation for Prieto You MD for a chief complaint of right knee pain.    CHIEF COMPLAINT: Candido Almeida is a 46 y.o.  male  Chief Complaint     Patient presents with       Consult      right knee pain       HISTORY OF PRESENTING INJURY:  This 46-year-old male presents with history of right knee pain. About 5-6 months. No history of trauma. Knee pain varies. At this point he has minimal pain to the knee. Notices pain mostly on the inside of the knee and usually worse with deep bending or twisting activities at home or at work. The patient works in the [employer] business. Walking on uneven surfaces also elicits knee pain. No history of swelling or redness. No bruising to the area. Recently treated with stretching exercises for tight hamstrings. This seems to help the knee. No history of prior knee or hip surgeries. His medical health is stable.  Today, he is more comfortable. X-rays have been taken.    REVIEW OF SYSTEMS:  Constitutional:  Denies constitutional problems  Cardiovascular: normal  Respiratory: normal    The review of systems as documented in the HPI and on the intake questionnaire, completed by the patient 2017, have been reviewed by myself and the pertinent positives and negatives addressed.  The remainder of the complete review of systems was non-contributory.    (PFSH) PAST, FAMILY, and/or SOCIAL HISTORY:    PAST MEDICAL HISTORY:  Past Medical History:     Diagnosis  Date     Hyperlipidemia 2013     HYPOTHYROIDISM        PAST SURGICAL HISTORY:  Past Surgical History:      Procedure   "Laterality Date     VASECTOMY      Vasectomy         ALLERGIES:  No Known Allergies    CURRENT MEDICATIONS:  Current Outpatient Prescriptions       Medication  Sig Dispense Refill     doxycycline (VIBRAMYCIN) 100 mg tablet Take 2 tablets following tick bite 10 tablet 0     levothyroxine (SYNTHROID) 112 mcg tablet Take 1 tablet by mouth before breakfast. 90 tablet 3     pravastatin (PRAVACHOL) 40 mg tablet Take 1 tablet by mouth once daily. - For heart attack risk reduction - Restart as of 4/17/2017 - likely familial hyperlipidemia 90 tablet 3     No current facility-administered medications for this visit.      Medications have been reviewed by me and are current to the best of my knowledge and ability.      FAMILY HISTORY:  Family History       Problem   Relation Age of Onset     Genetic  Other      Hypercholesterolemia, No FHx of DM, CVA, Cancers       Cancer-colon  Other      No FHx         Additional PFSH information documented on the intake form completed by the patient 5/1/2017 was reviewed by myself.    PHYSICAL EXAM:   /80  Pulse 64  Ht 1.753 m (5' 9\")  Wt 83.9 kg (185 lb)  BMI 27.32 kg/m2 Body mass index is 27.32 kg/(m^2).    General Appearance: Pleasant male in good appearance, mood and affect.  Alert and orientated times three ( time, date and location).    Examination of Right knee:  Standing and walking demonstrates no limping.  Skin: Normal.    Sensation is Normal  Alignment: Neutral  Effusion: none  Passive extension: Full   Passive flexion: 120+  Patella tracks:  without an inverted J sign  Varus stress testing: is stable  Valgus stress testing: is stable  Anterior drawer test: is stable  Posterior drawer test: is stable  Lachman's test: is stable  Loli's is: Positive  Taft test is: Increased pain with hyperflexion  Apley's grind test: Negative  medial and posterior medial joint line tenderness with palpation.    Hip: Bilateral  both hips have comfortable active and passive " motion    negative pain with log roll testing    Calf:  negative tenderness    Neurological / Vascular Examination:  Lower extremity edema present: Absent  Sensation: Normal  Distal pulses: present    Xray/MRI/MRA:  Radiographic images where independently reviewed and discussed with the patient.   X-rays of the right knee demonstrates overall well-maintained joint space on multiple views. Overall good alignment    IMPRESSION:  46-year-old male with history of chronic intermittent right medial knee pain  suspect medial meniscal pathology  ligaments intact on examination    PLAN:  Discussion included review of recent x-rays. Discussed with the patient his ligaments appear stable on examination.  Suspect his symptoms are related to articular and/or meniscal pathology on the medial side.  At this point he feels more comfortable with the knee. He is interested in monitoring.  Recommendations to use caution with activities including deep bending or twisting.  If symptoms worsen again, recommend calling back. Recommend MRI to further evaluate at that point.  Questions answered.  He will apply ice to the area as needed and over-the-counter medication if needed.    Dayne Ruffin D.O., F.A.O.A.O.  Orthopedic Surgeon    [facility]  [address] [place] [address]  Phone [phone]  Fax [phone]    9:55 AM 5/1/2017

## 2018-01-23 ENCOUNTER — DOCUMENTATION ONLY (OUTPATIENT)
Dept: FAMILY MEDICINE | Facility: OTHER | Age: 48
End: 2018-01-23

## 2018-01-23 PROBLEM — M25.561 RIGHT MEDIAL KNEE PAIN: Status: ACTIVE | Noted: 2017-04-17

## 2018-01-23 PROBLEM — M62.89 HAMSTRING TIGHTNESS OF LEFT LOWER EXTREMITY: Status: ACTIVE | Noted: 2017-04-17

## 2018-01-23 RX ORDER — PRAVASTATIN SODIUM 40 MG
40 TABLET ORAL DAILY
COMMUNITY
Start: 2017-04-17 | End: 2018-06-05

## 2018-01-23 RX ORDER — DOXYCYCLINE HYCLATE 100 MG
TABLET ORAL
COMMUNITY
Start: 2014-08-25 | End: 2019-04-17

## 2018-01-23 RX ORDER — LEVOTHYROXINE SODIUM 112 UG/1
112 TABLET ORAL
COMMUNITY
Start: 2017-04-17 | End: 2018-03-27

## 2018-01-25 VITALS
BODY MASS INDEX: 27.4 KG/M2 | SYSTOLIC BLOOD PRESSURE: 118 MMHG | WEIGHT: 185 LBS | HEART RATE: 64 BPM | HEIGHT: 69 IN | DIASTOLIC BLOOD PRESSURE: 80 MMHG

## 2018-01-25 VITALS
BODY MASS INDEX: 27.4 KG/M2 | HEART RATE: 64 BPM | WEIGHT: 185 LBS | DIASTOLIC BLOOD PRESSURE: 80 MMHG | HEIGHT: 69 IN | SYSTOLIC BLOOD PRESSURE: 118 MMHG

## 2018-01-25 VITALS
TEMPERATURE: 96 F | BODY MASS INDEX: 27.2 KG/M2 | HEIGHT: 70 IN | HEART RATE: 76 BPM | DIASTOLIC BLOOD PRESSURE: 76 MMHG | SYSTOLIC BLOOD PRESSURE: 120 MMHG | WEIGHT: 190 LBS

## 2018-02-03 ASSESSMENT — PATIENT HEALTH QUESTIONNAIRE - PHQ9
SUM OF ALL RESPONSES TO PHQ QUESTIONS 1-9: 0
SUM OF ALL RESPONSES TO PHQ QUESTIONS 1-9: 0

## 2018-06-05 ENCOUNTER — OFFICE VISIT (OUTPATIENT)
Dept: INTERNAL MEDICINE | Facility: OTHER | Age: 48
End: 2018-06-05
Attending: INTERNAL MEDICINE
Payer: COMMERCIAL

## 2018-06-05 VITALS
SYSTOLIC BLOOD PRESSURE: 118 MMHG | WEIGHT: 177.38 LBS | BODY MASS INDEX: 26.19 KG/M2 | HEART RATE: 64 BPM | DIASTOLIC BLOOD PRESSURE: 76 MMHG

## 2018-06-05 DIAGNOSIS — Z00.00 ROUTINE GENERAL MEDICAL EXAMINATION AT A HEALTH CARE FACILITY: Primary | ICD-10-CM

## 2018-06-05 DIAGNOSIS — E03.4 HYPOTHYROIDISM DUE TO ACQUIRED ATROPHY OF THYROID: ICD-10-CM

## 2018-06-05 DIAGNOSIS — E78.2 MIXED DYSLIPIDEMIA: ICD-10-CM

## 2018-06-05 PROCEDURE — 99396 PREV VISIT EST AGE 40-64: CPT | Performed by: INTERNAL MEDICINE

## 2018-06-05 RX ORDER — LEVOTHYROXINE SODIUM 112 UG/1
112 TABLET ORAL DAILY
Qty: 90 TABLET | Refills: 3 | Status: SHIPPED | OUTPATIENT
Start: 2018-06-05 | End: 2019-06-17

## 2018-06-05 ASSESSMENT — ENCOUNTER SYMPTOMS
COUGH: 0
WHEEZING: 0
ABDOMINAL PAIN: 0
FEVER: 0
MYALGIAS: 0
BRUISES/BLEEDS EASILY: 0
VOMITING: 0
CONFUSION: 0
HEMATURIA: 0
EYE PAIN: 0
DYSURIA: 0
AGITATION: 0
CHILLS: 0
NAUSEA: 0
LIGHT-HEADEDNESS: 0
DIZZINESS: 0
ARTHRALGIAS: 0
FATIGUE: 0
PALPITATIONS: 0
SHORTNESS OF BREATH: 0
DIARRHEA: 0

## 2018-06-05 ASSESSMENT — ANXIETY QUESTIONNAIRES
7. FEELING AFRAID AS IF SOMETHING AWFUL MIGHT HAPPEN: NOT AT ALL
3. WORRYING TOO MUCH ABOUT DIFFERENT THINGS: NOT AT ALL
6. BECOMING EASILY ANNOYED OR IRRITABLE: NOT AT ALL
5. BEING SO RESTLESS THAT IT IS HARD TO SIT STILL: NOT AT ALL
GAD7 TOTAL SCORE: 0
IF YOU CHECKED OFF ANY PROBLEMS ON THIS QUESTIONNAIRE, HOW DIFFICULT HAVE THESE PROBLEMS MADE IT FOR YOU TO DO YOUR WORK, TAKE CARE OF THINGS AT HOME, OR GET ALONG WITH OTHER PEOPLE: NOT DIFFICULT AT ALL
1. FEELING NERVOUS, ANXIOUS, OR ON EDGE: NOT AT ALL
2. NOT BEING ABLE TO STOP OR CONTROL WORRYING: NOT AT ALL

## 2018-06-05 ASSESSMENT — PAIN SCALES - GENERAL: PAINLEVEL: NO PAIN (0)

## 2018-06-05 ASSESSMENT — PATIENT HEALTH QUESTIONNAIRE - PHQ9: 5. POOR APPETITE OR OVEREATING: NOT AT ALL

## 2018-06-05 NOTE — PATIENT INSTRUCTIONS
Schedule lab only appointment 6/6.     Medications refilled.    After turning 48 -- consider colonoscopy or stool blood testing -- Guiac yearly or Cologuard every 3 years.     Return in approximately 1 year, or sooner as needed for follow-up with Dr. You.    Clinic : [phone]  Appointment line: [phone]

## 2018-06-05 NOTE — PROGRESS NOTES
Nursing Notes:   Zina Dash, LPN  6/5/2018  3:15 PM  Signed  Patient presents to the clinic for medication management.      Zina Dash LPN 6/5/2018 2:50 PM      Nursing note reviewed with patient.  Accurracy and completeness verified.   Mr. Almeida is a 47 year old male who:  Patient presents with:  Recheck Medication    HPI     ICD-10-CM    1. Routine general medical examination at a health care facility Z00.00    2. Hypothyroidism due to acquired atrophy of thyroid E03.4 levothyroxine (SYNTHROID/LEVOTHROID) 112 MCG tablet     CBC and Differential     Comprehensive metabolic panel     Thyrotropin GH   3. Mixed dyslipidemia E78.2 Lipid Panel     Patient presents for annual physical examination.  He has been feeling well.  Has no concerns at this time.    Hypothyroidism, states his energy levels are doing well.  Needs medication refills today.  Labs ordered.    Healthcare screening, we discussed colonoscopy versus stool guaiac testing.  He will notify us his decision once he turns 48 years old.    Mixed hyperlipidemia, tried diet controlled.  Was on pravastatin for a little while but discontinued.  States that his wife is worried about this type of medications.  He would like to get some fasting labs collected.  Orders placed.    Immunizations are up-to-date.    Functional Capacity: > 4 METS.   Reports that he can climb a flight of stairs without any chest pain/heaviness or shortness of breath.   No orthopnea/paroxysmal nocturnal dyspnea  Review of Systems   Constitutional: Negative for chills, fatigue and fever.   HENT: Negative for congestion and hearing loss.    Eyes: Negative for pain and visual disturbance.   Respiratory: Negative for cough, shortness of breath and wheezing.    Cardiovascular: Negative for chest pain and palpitations.   Gastrointestinal: Negative for abdominal pain, diarrhea, nausea and vomiting.   Endocrine: Negative for cold intolerance and heat intolerance.   Genitourinary: Negative for  dysuria and hematuria.   Musculoskeletal: Negative for arthralgias and myalgias.   Skin: Negative for pallor.   Allergic/Immunologic: Negative for immunocompromised state.   Neurological: Negative for dizziness and light-headedness.   Hematological: Does not bruise/bleed easily.   Psychiatric/Behavioral: Negative for agitation and confusion.      NAJMA:   NAJMA-7 SCORE 6/5/2018   Total Score 0     PHQ9:  PHQ-9 SCORE 4/17/2017 5/1/2017 6/5/2018   Total Score 0 0 0       I have personally reviewed the past medical history, past surgical history, medications, allergies, family and social history as listed below, on 6/5/2018.    No Known Allergies    Current Outpatient Prescriptions   Medication Sig Dispense Refill     doxycycline (VIBRA-TABS) 100 MG tablet Take 2 tablets following tick bite       levothyroxine (SYNTHROID/LEVOTHROID) 112 MCG tablet Take 1 tablet (112 mcg) by mouth daily 90 tablet 3     [DISCONTINUED] levothyroxine (SYNTHROID/LEVOTHROID) 112 MCG tablet TAKE 1 TABLET BY MOUTH BEFORE BREAKFAST 90 tablet 3        Patient Active Problem List    Diagnosis Date Noted     Hamstring tightness of left lower extremity 04/17/2017     Priority: Medium     Right medial knee pain 04/17/2017     Priority: Medium     Hypothyroidism due to acquired atrophy of thyroid 12/01/2015     Priority: Medium     Health care maintenance 11/23/2015     Priority: Medium     Family history of heart disease in male family member before age 55 08/25/2014     Priority: Medium     Nummular dermatitis 08/25/2014     Priority: Medium     Health examination of defined subpopulation 05/23/2013     Priority: Medium     Mixed dyslipidemia 05/23/2013     Priority: Medium     Rash 05/23/2013     Priority: Medium     Past Medical History:   Diagnosis Date     Hyperlipidemia     5/23/2013     Hypothyroidism     No Comments Provided     Past Surgical History:   Procedure Laterality Date     VASECTOMY      Vasectomy     Social History     Social History  "    Marital status:      Spouse name: Lyn     Number of children: N/A     Years of education: N/A     Social History Main Topics     Smoking status: Never Smoker     Smokeless tobacco: Current User     Types: Chew     Alcohol use 2.4 oz/week     Drug use: No     Sexual activity: Not Asked     Other Topics Concern     None     Social History Narrative    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   Son and daughter.   Wife Lyn.     Family History   Problem Relation Age of Onset     Genetic Disorder Other      Genetic,Hypercholesterolemia, No FHx of DM, CVA, Cancers     Colon Cancer Other      Cancer-colon,No FHx       EXAM:   Vitals:    06/05/18 1455   BP: 118/76   BP Location: Right arm   Patient Position: Sitting   Cuff Size: Adult Regular   Pulse: 64   Weight: 177 lb 6 oz (80.5 kg)       Current Pain Score: No Pain (0)     BP Readings from Last 3 Encounters:   06/05/18 118/76   05/01/17 118/80   05/01/17 118/80    Wt Readings from Last 3 Encounters:   06/05/18 177 lb 6 oz (80.5 kg)   05/01/17 185 lb (83.9 kg)   05/01/17 185 lb (83.9 kg)      Estimated body mass index is 26.19 kg/(m^2) as calculated from the following:    Height as of 5/1/17: 5' 9\" (1.753 m).    Weight as of this encounter: 177 lb 6 oz (80.5 kg).     Physical Exam   Constitutional: He appears well-developed and well-nourished. No distress.   HENT:   Head: Normocephalic and atraumatic.   Right Ear: External ear normal.   Left Ear: External ear normal.   Mouth/Throat: Oropharynx is clear and moist. No oropharyngeal exudate.   Eyes: Conjunctivae are normal. No scleral icterus.   Neck: No thyromegaly present.   Cardiovascular: Normal rate and regular rhythm.    Pulmonary/Chest: Effort normal. No respiratory distress. He has no wheezes.   Abdominal: Soft. There is no tenderness.   Musculoskeletal: He exhibits no edema, tenderness or deformity.   Lymphadenopathy:     He has no cervical adenopathy.   Neurological: He is alert. No cranial nerve deficit. "   Skin: Skin is warm and dry.   Psychiatric: He has a normal mood and affect.      INVESTIGATIONS:  Results for orders placed or performed during the hospital encounter of 05/01/17   XR Knee Right 3 Views    Narrative    PROCEDURE:  XR KNEE 3 VIEWS AP LAT SUNRISE RIGHT    HISTORY: Right medial knee pain.    COMPARISON:  None.    TECHNIQUE:  3 views of the right knee were obtained.    FINDINGS:  There are mild degenerative changes with medial compartment joint space narrowing. No acute fracture or dislocation.      Impression    Mild degenerative disease.    Electronically Signed By: Charity Julien M.D. on 5/1/2017 10:12 AM       ASSESSMENT AND PLAN:  Problem List Items Addressed This Visit        Endocrine    Hypothyroidism due to acquired atrophy of thyroid    Relevant Medications    levothyroxine (SYNTHROID/LEVOTHROID) 112 MCG tablet    Other Relevant Orders    CBC and Differential    Comprehensive metabolic panel    Thyrotropin GH    Mixed dyslipidemia    Relevant Orders    Lipid Panel      Other Visit Diagnoses     Routine general medical examination at a health care facility    -  Primary        reviewed diet, exercise and weight control, cardiovascular risk and specific lipid/LDL goals reviewed  -- Expected clinical course discussed    -- Medications and their side effects discussed    The 10-year ASCVD risk score ([place] CLARISSE Jr, et al., 2013) is: 5.1%    Values used to calculate the score:      Age: 47 years      Sex: Male      Is Non- : No      Diabetic: No      Tobacco smoker: No      Systolic Blood Pressure: 118 mmHg      Is BP treated: No      HDL Cholesterol: 33 mg/dL      Total Cholesterol: 227 mg/dL    Patient Instructions   Schedule lab only appointment 6/6.     Medications refilled.    After turning 50 -- consider colonoscopy or stool blood testing -- Guiac yearly or Cologuard every 3 years.     Return in approximately 1 year, or sooner as needed for follow-up with   Kenyatta.    Clinic : [phone]  Appointment line: [phone]    Prieto You MD  Internal Medicine  [facility]

## 2018-06-05 NOTE — MR AVS SNAPSHOT
After Visit Summary   6/5/2018    Candido Almeida    MRN: [id number]           Patient Information     Date Of Birth          1970        Visit Information        Provider Department      6/5/2018 3:40 PM Prieto You MD [facility]        Today's Diagnoses     Routine general medical examination at a health care facility    -  1    Hypothyroidism due to acquired atrophy of thyroid        Mixed dyslipidemia          Care Instructions    Schedule lab only appointment 6/6.     Medications refilled.    After turning 48 -- consider colonoscopy or stool blood testing -- Guiac yearly or Cologuard every 3 years.     Return in approximately 1 year, or sooner as needed for follow-up with Dr. You.    Clinic : [phone]  Appointment line: [phone]          Follow-ups after your visit        Follow-up notes from your care team     Return in about 1 year (around 6/5/2019) for Physical Exam.      Your next 10 appointments already scheduled     Jun 05, 2018  3:40 PM CDT   Office Visit with Prieto You MD   [facility] ([facility])    [address]  [place] [id number] [phone]           Bring a current list of meds and any records pertaining to this visit. For Physicals, please bring immunization records and any forms needing to be filled out. Please arrive 10 minutes early to complete paperwork.              Future tests that were ordered for you today     Open Future Orders        Priority Expected Expires Ordered    CBC and Differential Routine 6/6/2018 6/29/2018 6/5/2018    Comprehensive metabolic panel Routine 6/6/2018 6/29/2018 6/5/2018    Thyrotropin GH Routine 6/6/2018 6/29/2018 6/5/2018    Lipid Panel Routine 6/6/2018 6/29/2018 6/5/2018            Who to contact     If you have questions or need follow up information about today's clinic visit or your schedule please contact [facility]  [facility] directly at [phone].  Normal or non-critical lab and imaging results will be communicated to you by Meliuzhart, letter or phone within 4 business days after the clinic has received the results. If you do not hear from us within 7 days, please contact the clinic through Meliuzhart or phone. If you have a critical or abnormal lab result, we will notify you by phone as soon as possible.  Submit refill requests through SocialF5 or call your pharmacy and they will forward the refill request to us. Please allow 3 business days for your refill to be completed.          Additional Information About Your Visit        Meliuzhart Information     SocialF5 lets you send messages to your doctor, view your test results, renew your prescriptions, schedule appointments and more. To sign up, go to [email or web address] . Click on \"Log in\" on the left side of the screen, which will take you to the Welcome page. Then click on \"Sign up Now\" on the right side of the page.     You will be asked to enter the access code listed below, as well as some personal information. Please follow the directions to create your username and password.     Your access code is: [email or web address]  Expires: 9/3/2018  3:30 PM     Your access code will  in 90 days. If you need help or a new code, please call your [place] clinic or [phone].        Care EveryWhere ID     This is your Care EveryWhere ID. This could be used by other organizations to access your [place] medical records  [id number]        Your Vitals Were     Pulse BMI (Body Mass Index)                64 26.19 kg/m2           Blood Pressure from Last 3 Encounters:   18 118/76   17 118/80   17 118/80    Weight from Last 3 Encounters:   18 177 lb 6 oz (80.5 kg)   17 185 lb (83.9 kg)   17 185 lb (83.9 kg)                 Today's Medication Changes          These changes are accurate as of 18  3:30 PM.  If you have any questions, ask your nurse " or doctor.               These medicines have changed or have updated prescriptions.        Dose/Directions    levothyroxine 112 MCG tablet   Commonly known as:  SYNTHROID/LEVOTHROID   This may have changed:  See the new instructions.   Used for:  Hypothyroidism due to acquired atrophy of thyroid   Changed by:  Prieto You MD        Dose:  112 mcg   Take 1 tablet (112 mcg) by mouth daily   Quantity:  90 tablet   Refills:  3            Where to get your medicines      These medications were sent to [id number] - [place], [place] - [address], [facility] [address]     Phone:  [phone]     levothyroxine 112 MCG tablet                Primary Care Provider Office Phone # Fax #    Prieto You [phone]       [address] [facility] [address]        Equal Access to Services     [facility]: Hadii melissa felton hadasho Soomaali, waaxda luqadaha, qaybta kaalmada tatiyapat, renu estrada . So [facility] [phone].    ATENCIÓN: Si habla español, tiene a mcghee disposición servicios gratuitos de asistencia lingüística. Llame al [phone].    We comply with applicable federal civil rights laws and [place] laws. We do not discriminate on the basis of race, color, national origin, age, disability, sex, sexual orientation, or gender identity.            Thank you!     Thank you for choosing [facility]  for your care. Our goal is always to provide you with excellent care. Hearing back from our patients is one way we can continue to improve our services. Please take a few minutes to complete the written survey that you may receive in the mail after your visit with us. Thank you!             Your Updated Medication List - Protect others around you: Learn how to safely use, store and throw away your medicines at www.disposemymeds.org.          This list is accurate as of 6/5/18  3:30 PM.   Always use your most recent med list.                   Brand Name Dispense Instructions for use Diagnosis    doxycycline 100 MG tablet    VIBRA-TABS     Take 2 tablets following tick bite        levothyroxine 112 MCG tablet    SYNTHROID/LEVOTHROID    90 tablet    Take 1 tablet (112 mcg) by mouth daily    Hypothyroidism due to acquired atrophy of thyroid

## 2018-06-06 ASSESSMENT — ANXIETY QUESTIONNAIRES: GAD7 TOTAL SCORE: 0

## 2018-06-06 ASSESSMENT — PATIENT HEALTH QUESTIONNAIRE - PHQ9: SUM OF ALL RESPONSES TO PHQ QUESTIONS 1-9: 0

## 2018-06-08 DIAGNOSIS — E78.2 MIXED DYSLIPIDEMIA: ICD-10-CM

## 2018-06-08 DIAGNOSIS — E03.4 HYPOTHYROIDISM DUE TO ACQUIRED ATROPHY OF THYROID: ICD-10-CM

## 2018-06-08 LAB
ALBUMIN SERPL-MCNC: 4.5 G/DL (ref 3.5–5.7)
ALP SERPL-CCNC: 49 U/L (ref 34–104)
ALT SERPL W P-5'-P-CCNC: 20 U/L (ref 7–52)
ANION GAP SERPL CALCULATED.3IONS-SCNC: 5 MMOL/L (ref 3–14)
AST SERPL W P-5'-P-CCNC: 19 U/L (ref 13–39)
BASOPHILS # BLD AUTO: 0 10E9/L (ref 0–0.2)
BASOPHILS NFR BLD AUTO: 0.2 %
BILIRUB SERPL-MCNC: 0.8 MG/DL (ref 0.3–1)
BUN SERPL-MCNC: 18 MG/DL (ref 7–25)
CALCIUM SERPL-MCNC: 9.6 MG/DL (ref 8.6–10.3)
CHLORIDE SERPL-SCNC: 102 MMOL/L (ref 98–107)
CHOLEST SERPL-MCNC: 229 MG/DL
CO2 SERPL-SCNC: 32 MMOL/L (ref 21–31)
CREAT SERPL-MCNC: 0.92 MG/DL (ref 0.7–1.3)
DIFFERENTIAL METHOD BLD: NORMAL
EOSINOPHIL # BLD AUTO: 0.1 10E9/L (ref 0–0.7)
EOSINOPHIL NFR BLD AUTO: 1.3 %
ERYTHROCYTE [DISTWIDTH] IN BLOOD BY AUTOMATED COUNT: 12.2 % (ref 10–15)
GFR SERPL CREATININE-BSD FRML MDRD: 87 ML/MIN/1.7M2
GLUCOSE SERPL-MCNC: 109 MG/DL (ref 70–105)
HCT VFR BLD AUTO: 45.7 % (ref 40–53)
HDLC SERPL-MCNC: 36 MG/DL (ref 23–92)
HGB BLD-MCNC: 15.8 G/DL (ref 13.3–17.7)
IMM GRANULOCYTES # BLD: 0 10E9/L (ref 0–0.4)
IMM GRANULOCYTES NFR BLD: 0.4 %
LDLC SERPL CALC-MCNC: 161 MG/DL
LYMPHOCYTES # BLD AUTO: 2 10E9/L (ref 0.8–5.3)
LYMPHOCYTES NFR BLD AUTO: 38.5 %
MCH RBC QN AUTO: 30.8 PG (ref 26.5–33)
MCHC RBC AUTO-ENTMCNC: 34.6 G/DL (ref 31.5–36.5)
MCV RBC AUTO: 89 FL (ref 78–100)
MONOCYTES # BLD AUTO: 0.5 10E9/L (ref 0–1.3)
MONOCYTES NFR BLD AUTO: 8.8 %
NEUTROPHILS # BLD AUTO: 2.7 10E9/L (ref 1.6–8.3)
NEUTROPHILS NFR BLD AUTO: 50.8 %
NONHDLC SERPL-MCNC: 193 MG/DL
PLATELET # BLD AUTO: 193 10E9/L (ref 150–450)
POTASSIUM SERPL-SCNC: 4.4 MMOL/L (ref 3.5–5.1)
PROT SERPL-MCNC: 6.5 G/DL (ref 6.4–8.9)
RBC # BLD AUTO: 5.13 10E12/L (ref 4.4–5.9)
SODIUM SERPL-SCNC: 139 MMOL/L (ref 134–144)
TRIGL SERPL-MCNC: 159 MG/DL
TSH SERPL DL<=0.05 MIU/L-ACNC: 1.75 IU/ML (ref 0.34–5.6)
WBC # BLD AUTO: 5.2 10E9/L (ref 4–11)

## 2018-06-08 PROCEDURE — 36415 COLL VENOUS BLD VENIPUNCTURE: CPT | Performed by: INTERNAL MEDICINE

## 2018-06-08 PROCEDURE — 80061 LIPID PANEL: CPT | Performed by: INTERNAL MEDICINE

## 2018-06-08 PROCEDURE — 80053 COMPREHEN METABOLIC PANEL: CPT | Performed by: INTERNAL MEDICINE

## 2018-06-08 PROCEDURE — 84443 ASSAY THYROID STIM HORMONE: CPT | Performed by: INTERNAL MEDICINE

## 2018-06-08 PROCEDURE — 85025 COMPLETE CBC W/AUTO DIFF WBC: CPT | Performed by: INTERNAL MEDICINE

## 2018-07-23 NOTE — PROGRESS NOTES
Patient Information     Patient Name  Candido Almeida MRN  [id number] Sex  Male   1970      Letter by Prieto You MD at      Author:  Prieto You MD Service:  (none) Author Type:  (none)    Filed:   Encounter Date:  2017 Status:  (Other)           Candido Almeida  [address]  [facility] [address]          2017    Dear Mr. Almeida:    Following are the tests completed during your last clinic visit.  The results of these tests are included below.      -- Your cholesterol levels are once again very high.    Back in 2009.  Total cholesterol was 258, triglyceride 249, HDL 34, LDL was 174.     Numbers got down as good as:  total cholesterol of 138, triglycerides 219, HDL 33, LDL 61 as of 2014.    -- Recommend that you Re-Start Pravastatin 40 mg once daily.    -- Even taking pravastatin every other day would be better than none.    Diet, weight loss, exercise will be very helpful for a lot of other reasons, such as heart health, blood sugar reduction, blood pressure reduction, improved cardiovascular fitness....     However you likely have familial hyperlipidemia -- which means diet and exercise are not enough by themselves.  You need some kind of medication to help bring your cholesterol down.    Prostate lab is normal.    Blood counts are normal.    Kidney function and liver tests are normal.    Diabetes lab or hemoglobin A1c was normal.  Your random blood sugar was slightly elevated at 106.    Vitamin D level is adequate.  Continue current supplements/vitamin D dosing.    Thyroid level is adequate.  Continue same dose of Synthroid.    Your high sensitivity CRP level measured 0.775.  This is pretty good.  The lower the better.    Results for orders placed or performed in visit on 17      CBC W PLT NO DIFF      Result  Value Ref Range    WHITE BLOOD COUNT         4.7 4.5 - 11.0 thou/cu mm    RED BLOOD COUNT           5.21 4.30 - 5.90 mil/cu mm    HEMOGLOBIN                 16.3 13.5 - 17.5 g/dL    HEMATOCRIT                48.2 37.0 - 53.0 %    MCV                       93 80 - 100 fL    MCH                       31.3 26.0 - 34.0 pg    MCHC                      33.8 32.0 - 36.0 g/dL    RDW                       11.1 (L) 11.5 - 15.5 %    PLATELET COUNT            185 140 - 440 thou/cu mm    MPV                       11.1 (H) 6.5 - 11.0 fL   COMP METABOLIC PANEL      Result  Value Ref Range    SODIUM 139 133 - 143 mmol/L    POTASSIUM 4.4 3.5 - 5.1 mmol/L    CHLORIDE 103 98 - 107 mmol/L    CO2,TOTAL 30 21 - 31 mmol/L    ANION GAP 6 5 - 18                    GLUCOSE 106 (H) 70 - 105 mg/dL    CALCIUM 9.3 8.6 - 10.3 mg/dL    BUN 15 7 - 25 mg/dL    CREATININE 0.96 0.70 - 1.30 mg/dL    BUN/CREAT RATIO           16                    GFR if African American >60 >60 ml/min/1.73m2    GFR if not African American >60 >60 ml/min/1.73m2    ALBUMIN 4.6 3.5 - 5.7 g/dL    PROTEIN,TOTAL 6.8 6.4 - 8.9 g/dL    GLOBULIN                  2.2 2.0 - 3.7 g/dL    A/G RATIO 2.1 (H) 1.0 - 2.0                    BILIRUBIN,TOTAL 0.6 0.3 - 1.0 mg/dL    ALK PHOSPHATASE 48 34 - 104 IU/L    ALT (SGPT) 27 7 - 52 IU/L    AST (SGOT) 22 13 - 39 IU/L   HEMOGLOBIN A1C MONITORING (POCT)      Result  Value Ref Range    HEMOGLOBIN A1C MONITORING (POCT) 5.2 4.0 - 6.2 %    ESTIMATED AVERAGE GLUCOSE  103 mg/dL   LIPID PANEL      Result  Value Ref Range    CHOLESTEROL,TOTAL 227 (H) <200 mg/dL    TRIGLYCERIDES 241 (H) <150 mg/dL    HDL CHOLESTEROL 33 23 - 92 mg/dL    NON-HDL CHOLESTEROL 194 (H) <145 mg/dl    CHOL/HDL RATIO            6.88 (H) <4.50                    LDL CHOLESTEROL 146 (H) <100 mg/dL    PATIENT STATUS            FASTING                   PSA, TOTAL      Result  Value Ref Range    PSA TOTAL (DIAGNOSTIC) 1.099 <=3.100 ng/mL   HIGH SENSITIVITY-CARD-CRP      Result  Value Ref Range    HS CRP 0.775 0.200 - 160.000 mg/L   TSH      Result  Value Ref Range    TSH 2.73 0.34 - 5.60 uIU/mL   VITAMIN D 25 (DEFICIENCY)       Result  Value Ref Range    VITAMIN D TOTAL AFL 69.1   ng/mL         If you have any further questions or problems contact my office at  [phone]   --- or send [employer] message --- otherwise schedule an appointment.    Clinic : [phone]  Appointment line: [phone]     Thank you,    Prieto You MD    Internal Medicine  [facility]     Reviewed and electronically signed by provider.

## 2018-10-24 ENCOUNTER — TELEPHONE (OUTPATIENT)
Dept: INTERNAL MEDICINE | Facility: OTHER | Age: 48
End: 2018-10-24

## 2018-10-24 DIAGNOSIS — L30.0 NUMMULAR DERMATITIS: Primary | ICD-10-CM

## 2018-10-24 DIAGNOSIS — R21 RASH: ICD-10-CM

## 2018-10-24 NOTE — TELEPHONE ENCOUNTER
DWS:  Patient would like another referral to a dermatologist.  The one he was seeing has retired.  Please call as needed.    Thank you

## 2018-10-24 NOTE — TELEPHONE ENCOUNTER
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  He has not seen one for a few years but now would like a new referral to see one in [place].  Sarah Rollins LPN..........10/24/2018  12:01 PM'

## 2019-04-17 ENCOUNTER — OFFICE VISIT (OUTPATIENT)
Dept: FAMILY MEDICINE | Facility: OTHER | Age: 49
End: 2019-04-17
Attending: FAMILY MEDICINE

## 2019-04-17 VITALS
WEIGHT: 177.8 LBS | BODY MASS INDEX: 27.91 KG/M2 | HEART RATE: 68 BPM | HEIGHT: 67 IN | SYSTOLIC BLOOD PRESSURE: 118 MMHG | RESPIRATION RATE: 20 BRPM | DIASTOLIC BLOOD PRESSURE: 70 MMHG | TEMPERATURE: 97.9 F

## 2019-04-17 DIAGNOSIS — Z00.00 PREVENTATIVE HEALTH CARE: Primary | ICD-10-CM

## 2019-04-17 LAB
ALBUMIN UR-MCNC: NEGATIVE MG/DL
APPEARANCE UR: CLEAR
BACTERIA #/AREA URNS HPF: ABNORMAL /HPF
BILIRUB UR QL STRIP: NEGATIVE
COLOR UR AUTO: YELLOW
GLUCOSE UR STRIP-MCNC: NEGATIVE MG/DL
HGB UR QL STRIP: ABNORMAL
KETONES UR STRIP-MCNC: NEGATIVE MG/DL
LEUKOCYTE ESTERASE UR QL STRIP: NEGATIVE
NITRATE UR QL: NEGATIVE
NON-SQ EPI CELLS #/AREA URNS LPF: ABNORMAL /LPF
PH UR STRIP: 6 PH (ref 5–9)
RBC #/AREA URNS AUTO: ABNORMAL /HPF
SOURCE: ABNORMAL
SP GR UR STRIP: 1.02 (ref 1–1.03)
UROBILINOGEN UR STRIP-ACNC: 0.2 EU/DL (ref 0.2–1)
WBC #/AREA URNS AUTO: ABNORMAL /HPF

## 2019-04-17 PROCEDURE — 99499 UNLISTED E&M SERVICE: CPT | Performed by: FAMILY MEDICINE

## 2019-04-17 PROCEDURE — 81001 URINALYSIS AUTO W/SCOPE: CPT | Performed by: FAMILY MEDICINE

## 2019-04-17 ASSESSMENT — PATIENT HEALTH QUESTIONNAIRE - PHQ9: SUM OF ALL RESPONSES TO PHQ QUESTIONS 1-9: 0

## 2019-04-17 ASSESSMENT — MIFFLIN-ST. JEOR: SCORE: 1625.13

## 2019-04-17 NOTE — NURSING NOTE
Patient here for DOT physical. Medication Reconciliation: complete.    Florence Canseco LPN  4/17/2019 9:09 AM

## 2019-04-17 NOTE — PROGRESS NOTES
"DOT  SUBJECTIVE:   Candido Almeida is a 48 year old male who presents to clinic today for the following health issues: DOT patient arrives here for DOT    Patient arrives here for DOT currently does not have any complaints or concerns        Patient Active Problem List    Diagnosis Date Noted     Hamstring tightness of left lower extremity 04/17/2017     Priority: Medium     Right medial knee pain 04/17/2017     Priority: Medium     Hypothyroidism due to acquired atrophy of thyroid 12/01/2015     Priority: Medium     Health care maintenance 11/23/2015     Priority: Medium     Family history of heart disease in male family member before age 55 08/25/2014     Priority: Medium     Nummular dermatitis 08/25/2014     Priority: Medium     Health examination of defined subpopulation 05/23/2013     Priority: Medium     Mixed dyslipidemia 05/23/2013     Priority: Medium     Rash 05/23/2013     Priority: Medium     Past Medical History:   Diagnosis Date     Hyperlipidemia     5/23/2013     Hypothyroidism     No Comments Provided      Past Surgical History:   Procedure Laterality Date     VASECTOMY      Vasectomy       Review of Systems     OBJECTIVE:     /70   Pulse 68   Temp 97.9  F (36.6  C)   Resp 20   Ht 1.702 m (5' 7\")   Wt 80.6 kg (177 lb 12.8 oz)   BMI 27.85 kg/m    Body mass index is 27.85 kg/m .  Physical Exam   Constitutional: He appears well-developed.   HENT:   Head: Normocephalic.   Right Ear: External ear normal.   Left Ear: External ear normal.   Eyes: Pupils are equal, round, and reactive to light. EOM are normal.   Cardiovascular: Normal rate, regular rhythm and normal heart sounds.   Pulmonary/Chest: Effort normal and breath sounds normal.   Abdominal: Bowel sounds are normal.   Genitourinary: Penis normal.   Musculoskeletal: Normal range of motion.   Skin: Skin is warm.       Diagnostic Test Results:  Results for orders placed or performed in visit on 04/17/19 (from the past 24 hour(s))   UA " reflex to Microscopic and Culture   Result Value Ref Range    Color Urine Yellow     Appearance Urine Clear     Glucose Urine Negative NEG^Negative mg/dL    Bilirubin Urine Negative NEG^Negative    Ketones Urine Negative NEG^Negative mg/dL    Specific Gravity Urine 1.020 1.000 - 1.030    Blood Urine Trace (A) NEG^Negative    pH Urine 6.0 5.0 - 9.0 pH    Protein Albumin Urine Negative NEG^Negative mg/dL    Urobilinogen Urine 0.2 0.2 - 1.0 EU/dL    Nitrite Urine Negative NEG^Negative    Leukocyte Esterase Urine Negative NEG^Negative    Source Midstream Urine    Urine Microscopic   Result Value Ref Range    WBC Urine 0 - 5 OTO5^0 - 5 /HPF    RBC Urine O - 2 OTO2^O - 2 /HPF    Squamous Epithelial /LPF Urine Few FEW^Few /LPF    Bacteria Urine Few (A) NEG^Negative /HPF       ASSESSMENT/PLAN:         1. Preventative health care  Satisfactory DOT.  To year certificate given.  - UA reflex to Microscopic and Culture  - Urine Microscopic      Fredy Scales MD  [facility] AND \[facility]\""

## 2019-06-12 ENCOUNTER — TELEPHONE (OUTPATIENT)
Dept: INTERNAL MEDICINE | Facility: OTHER | Age: 49
End: 2019-06-12

## 2019-06-12 DIAGNOSIS — Z91.89 AT HIGH RISK FOR TICK BORNE ILLNESS: Primary | ICD-10-CM

## 2019-06-12 RX ORDER — DOXYCYCLINE HYCLATE 100 MG
TABLET ORAL
Qty: 60 TABLET | Refills: 1 | Status: SHIPPED | OUTPATIENT
Start: 2019-06-12

## 2019-06-12 NOTE — TELEPHONE ENCOUNTER
Okay for Cologuard. Please have patient complete form.     Doxycycline refill sent to pharmacy.    Prieto You MD

## 2019-06-12 NOTE — TELEPHONE ENCOUNTER
Spoke to patient.  He stated he wants another refill of his Doxycycline because he only has 2 tabs left.  Patient also stated he is interested in cologuard.  Jennifer James LPN 6/12/2019   8:20 AM

## 2019-06-12 NOTE — TELEPHONE ENCOUNTER
After verifying last name and date of birth, patient was relayed the message from below. This writer also informed patient the form to fill out would be at unit 3 check in for him to .  Dena Chris LPN on 6/12/2019 at 10:36 AM

## 2019-06-13 ENCOUNTER — TELEPHONE (OUTPATIENT)
Dept: INTERNAL MEDICINE | Facility: OTHER | Age: 49
End: 2019-06-13

## 2019-06-13 ENCOUNTER — MEDICAL CORRESPONDENCE (OUTPATIENT)
Dept: INTERNAL MEDICINE | Facility: OTHER | Age: 49
End: 2019-06-13

## 2019-06-17 DIAGNOSIS — E03.4 HYPOTHYROIDISM DUE TO ACQUIRED ATROPHY OF THYROID: ICD-10-CM

## 2019-06-19 RX ORDER — LEVOTHYROXINE SODIUM 112 UG/1
TABLET ORAL
Qty: 90 TABLET | Refills: 0 | Status: SHIPPED | OUTPATIENT
Start: 2019-06-19 | End: 2019-09-10

## 2019-06-19 NOTE — TELEPHONE ENCOUNTER
Prescription refilled per RN Medication Refill Policy..................Sahra Medellin 6/19/2019 1:36 PM

## 2019-06-23 ENCOUNTER — TRANSFERRED RECORDS (OUTPATIENT)
Dept: HEALTH INFORMATION MANAGEMENT | Facility: OTHER | Age: 49
End: 2019-06-23

## 2019-07-23 ENCOUNTER — MYC MEDICAL ADVICE (OUTPATIENT)
Dept: INTERNAL MEDICINE | Facility: OTHER | Age: 49
End: 2019-07-23

## 2019-07-23 NOTE — TELEPHONE ENCOUNTER
Called pt, verified name & , notified pt of negative cologuard results. Pt has no further questions.    Heidi Carroll LPN on 2019 at 10:50 AM

## 2019-10-15 ENCOUNTER — ALLIED HEALTH/NURSE VISIT (OUTPATIENT)
Dept: FAMILY MEDICINE | Facility: OTHER | Age: 49
End: 2019-10-15
Attending: INTERNAL MEDICINE
Payer: COMMERCIAL

## 2019-10-15 DIAGNOSIS — Z23 NEED FOR ZOSTER VACCINATION: ICD-10-CM

## 2019-10-15 DIAGNOSIS — Z23 NEED FOR PROPHYLACTIC VACCINATION AND INOCULATION AGAINST INFLUENZA: Primary | ICD-10-CM

## 2019-10-15 PROCEDURE — 90750 HZV VACC RECOMBINANT IM: CPT

## 2019-10-15 PROCEDURE — 90472 IMMUNIZATION ADMIN EACH ADD: CPT

## 2019-10-15 PROCEDURE — 90471 IMMUNIZATION ADMIN: CPT

## 2019-10-15 PROCEDURE — 90686 IIV4 VACC NO PRSV 0.5 ML IM: CPT

## 2019-10-15 NOTE — PROGRESS NOTES
Immunization Documentation  Verified patient's first and last name, and . Patient stated reason for visit today is to receive Shingrix, first dose and Influenza vaccine(s).Patient denied any concerns with previous immunizations. Allergies reviewed. VIS handouts reviewed and given to patient to take home. Shingrix prepared and administered IM intoper standing order. Administration documented in IMMUNIZATIONS (see flowsheet and order for further information).    Influenza Vaccination Documentation  Patient denied any concerns with previous influenza vaccinations. Influenza vaccination screening questions answered (see IMMUNIZATIONS for answers). Allergies reviewed. Influenza vaccine order placed per standing order. VIS handout reviewed and given to patient to take home. Influenza prepared and administered IM. Administration documented in IMMUNIZATIONS (see flowsheet and order for further information). Patient encouraged to wait in lobby for 15 minutes post-injection and notify staff immediately of any reaction.      Jennifer ORTIZN, RN on 10/15/2019 at 8:22 AM

## 2019-12-17 ENCOUNTER — ALLIED HEALTH/NURSE VISIT (OUTPATIENT)
Dept: FAMILY MEDICINE | Facility: OTHER | Age: 49
End: 2019-12-17
Attending: INTERNAL MEDICINE
Payer: COMMERCIAL

## 2019-12-17 DIAGNOSIS — Z23 NEED FOR ZOSTER VACCINATION: Primary | ICD-10-CM

## 2019-12-17 PROCEDURE — 90471 IMMUNIZATION ADMIN: CPT

## 2019-12-17 PROCEDURE — 90750 HZV VACC RECOMBINANT IM: CPT

## 2019-12-17 NOTE — PROGRESS NOTES
Immunization Documentation  Verified patient's first and last name, and . Stated reason for visit today is to receive Shingrix vaccine(s). Denied any concerns with previous immunizations. Allergies reviewed.  Screening Questionnaire Adult Immunization completed (see below). VIS handout(s) reviewed and given to take home. Shingrix prepared and administered IM per standing order. Administration documented in IMMUNIZATIONS (see flowsheet and order for further information). Instructed to wait in lobby for 15 minutes post-injection and notify RN immediately of any adverse reaction.     Screening Questionnaire for Adult Immunization    Are you sick today?   No   Do you have allergies to medications, food, a vaccine component, or latex?   No   Have you ever had a serious reaction after receiving a vaccination?   No   Have you received any vaccinations in the past 4 weeks?   No     Immunization questionnaire answers were all negative.      Jennifer ORTIZN, RN on 2019 at 7:55 AM

## 2020-09-13 DIAGNOSIS — E03.4 HYPOTHYROIDISM DUE TO ACQUIRED ATROPHY OF THYROID: ICD-10-CM

## 2020-09-15 RX ORDER — LEVOTHYROXINE SODIUM 112 UG/1
TABLET ORAL
Qty: 90 TABLET | Refills: 3 | Status: SHIPPED | OUTPATIENT
Start: 2020-09-15 | End: 2021-05-06

## 2020-09-15 NOTE — TELEPHONE ENCOUNTER
[facility] sent Rx request for the following:   levothyroxine (SYNTHROID/LEVOTHROID) 112 MCG tablet   Sig: TAKE 1 TABLET BY MOUTH ONCE DAILY     Last Prescription Date:   09/13/2019  Last Fill Qty/Refills:         90, R-3    Last Office Visit:              04/17/2019   Future Office visit:           none  Routing refill request to al for review/approval because:  Labs not current:  TSH  Component      Latest Ref Rng & Units 11/27/2015   TSH - Historical      0.34 - 5.60 uIU/mL 3.85       Patient needs to be seen because it has been more than 1 year since last office visit.    Unable to complete prescription refill per RN Medication Refill Policy.................... Ingris Morales RN ....................  9/15/2020   4:23 PM

## 2021-01-03 ENCOUNTER — HEALTH MAINTENANCE LETTER (OUTPATIENT)
Age: 51
End: 2021-01-03

## 2021-03-19 ENCOUNTER — IMMUNIZATION (OUTPATIENT)
Dept: FAMILY MEDICINE | Facility: OTHER | Age: 51
End: 2021-03-19
Attending: FAMILY MEDICINE
Payer: COMMERCIAL

## 2021-03-19 PROCEDURE — 0001A PR COVID VAC PFIZER DIL RECON 30 MCG/0.3 ML IM: CPT

## 2021-03-19 PROCEDURE — 91300 PR COVID VAC PFIZER DIL RECON 30 MCG/0.3 ML IM: CPT

## 2021-04-09 ENCOUNTER — IMMUNIZATION (OUTPATIENT)
Dept: FAMILY MEDICINE | Facility: OTHER | Age: 51
End: 2021-04-09
Attending: FAMILY MEDICINE
Payer: COMMERCIAL

## 2021-04-09 PROCEDURE — 0002A PR COVID VAC PFIZER DIL RECON 30 MCG/0.3 ML IM: CPT

## 2021-04-09 PROCEDURE — 91300 PR COVID VAC PFIZER DIL RECON 30 MCG/0.3 ML IM: CPT

## 2021-04-12 ENCOUNTER — OFFICE VISIT (OUTPATIENT)
Dept: FAMILY MEDICINE | Facility: OTHER | Age: 51
End: 2021-04-12
Attending: FAMILY MEDICINE

## 2021-04-12 ENCOUNTER — RESULTS ONLY (OUTPATIENT)
Dept: LAB | Age: 51
End: 2021-04-12

## 2021-04-12 DIAGNOSIS — Z02.89 HEALTH EXAMINATION OF DEFINED SUBPOPULATION: Primary | ICD-10-CM

## 2021-04-12 LAB
ALBUMIN UR-MCNC: NEGATIVE MG/DL
APPEARANCE UR: CLEAR
BILIRUB UR QL STRIP: NEGATIVE
COLOR UR AUTO: YELLOW
GLUCOSE UR STRIP-MCNC: NEGATIVE MG/DL
HGB UR QL STRIP: NEGATIVE
KETONES UR STRIP-MCNC: NEGATIVE MG/DL
LEUKOCYTE ESTERASE UR QL STRIP: NEGATIVE
NITRATE UR QL: NEGATIVE
PH UR STRIP: 5.5 PH (ref 5–7)
SOURCE: NORMAL
SP GR UR STRIP: 1.02 (ref 1–1.03)
UROBILINOGEN UR STRIP-MCNC: NORMAL MG/DL (ref 0–2)

## 2021-04-12 PROCEDURE — 99499 UNLISTED E&M SERVICE: CPT | Performed by: FAMILY MEDICINE

## 2021-04-13 ENCOUNTER — MYC MEDICAL ADVICE (OUTPATIENT)
Dept: INTERNAL MEDICINE | Facility: OTHER | Age: 51
End: 2021-04-13

## 2021-04-13 NOTE — PROGRESS NOTES
SUBJECTIVE:   Candido Almeida is a 50 year old male who presents to clinic today for the following health issues: Patient arrives here for DOT physical.  Currently does not have any complaints or concerns.    HPI      Patient Active Problem List    Diagnosis Date Noted     Hamstring tightness of left lower extremity 04/17/2017     Priority: Medium     Right medial knee pain 04/17/2017     Priority: Medium     Hypothyroidism due to acquired atrophy of thyroid 12/01/2015     Priority: Medium     Health care maintenance 11/23/2015     Priority: Medium     Family history of heart disease in male family member before age 55 08/25/2014     Priority: Medium     Nummular dermatitis 08/25/2014     Priority: Medium     Health examination of defined subpopulation 05/23/2013     Priority: Medium     Mixed dyslipidemia 05/23/2013     Priority: Medium     Rash 05/23/2013     Priority: Medium     Past Medical History:   Diagnosis Date     Hyperlipidemia     5/23/2013     Hypothyroidism     No Comments Provided      No Known Allergies    Review of Systems     OBJECTIVE:     There were no vitals taken for this visit.  There is no height or weight on file to calculate BMI.  Physical Exam    Diagnostic Test Results:  Results for orders placed or performed in visit on 04/12/21   UA reflex to Microscopic and Culture     Status: None    Specimen: Midstream Urine   Result Value Ref Range    Color Urine Yellow     Appearance Urine Clear     Glucose Urine Negative NEG^Negative mg/dL    Bilirubin Urine Negative NEG^Negative    Ketones Urine Negative NEG^Negative mg/dL    Specific Gravity Urine 1.019 1.003 - 1.035    Blood Urine Negative NEG^Negative    pH Urine 5.5 5.0 - 7.0 pH    Protein Albumin Urine Negative NEG^Negative mg/dL    Urobilinogen mg/dL Normal 0.0 - 2.0 mg/dL    Nitrite Urine Negative NEG^Negative    Leukocyte Esterase Urine Negative NEG^Negative    Source Midstream Urine        ASSESSMENT/PLAN:         1. Health  examination of defined subpopulation  Satisfactory DOT.  Please see copied forms.        Fredy Scales MD  [facility] AND [facility]

## 2021-05-06 ENCOUNTER — OFFICE VISIT (OUTPATIENT)
Dept: INTERNAL MEDICINE | Facility: OTHER | Age: 51
End: 2021-05-06
Attending: INTERNAL MEDICINE
Payer: COMMERCIAL

## 2021-05-06 VITALS
TEMPERATURE: 97.5 F | BODY MASS INDEX: 25.43 KG/M2 | OXYGEN SATURATION: 96 % | HEIGHT: 70 IN | RESPIRATION RATE: 16 BRPM | DIASTOLIC BLOOD PRESSURE: 52 MMHG | WEIGHT: 177.6 LBS | SYSTOLIC BLOOD PRESSURE: 138 MMHG | HEART RATE: 81 BPM

## 2021-05-06 DIAGNOSIS — Z12.11 COLON CANCER SCREENING: ICD-10-CM

## 2021-05-06 DIAGNOSIS — Z00.00 ANNUAL PHYSICAL EXAM: Primary | ICD-10-CM

## 2021-05-06 DIAGNOSIS — E78.2 MIXED HYPERLIPIDEMIA: ICD-10-CM

## 2021-05-06 DIAGNOSIS — E03.4 HYPOTHYROIDISM DUE TO ACQUIRED ATROPHY OF THYROID: ICD-10-CM

## 2021-05-06 DIAGNOSIS — Z12.5 ENCOUNTER FOR SCREENING FOR MALIGNANT NEOPLASM OF PROSTATE: ICD-10-CM

## 2021-05-06 DIAGNOSIS — Z91.89 AT HIGH RISK FOR TICK BORNE ILLNESS: ICD-10-CM

## 2021-05-06 DIAGNOSIS — M72.0 DUPUYTREN'S CONTRACTURE OF BOTH HANDS: ICD-10-CM

## 2021-05-06 LAB
ALBUMIN SERPL-MCNC: 4.6 G/DL (ref 3.5–5.7)
ALP SERPL-CCNC: 46 U/L (ref 34–104)
ALT SERPL W P-5'-P-CCNC: 22 U/L (ref 7–52)
ANION GAP SERPL CALCULATED.3IONS-SCNC: 7 MMOL/L (ref 3–14)
AST SERPL W P-5'-P-CCNC: 19 U/L (ref 13–39)
BILIRUB SERPL-MCNC: 0.7 MG/DL (ref 0.3–1)
BUN SERPL-MCNC: 23 MG/DL (ref 7–25)
CALCIUM SERPL-MCNC: 9.3 MG/DL (ref 8.6–10.3)
CHLORIDE SERPL-SCNC: 101 MMOL/L (ref 98–107)
CHOLEST SERPL-MCNC: 249 MG/DL
CO2 SERPL-SCNC: 28 MMOL/L (ref 21–31)
CREAT SERPL-MCNC: 0.83 MG/DL (ref 0.7–1.3)
ERYTHROCYTE [DISTWIDTH] IN BLOOD BY AUTOMATED COUNT: 12.3 % (ref 10–15)
GFR SERPL CREATININE-BSD FRML MDRD: >90 ML/MIN/{1.73_M2}
GLUCOSE SERPL-MCNC: 110 MG/DL (ref 70–105)
HCT VFR BLD AUTO: 43.9 % (ref 40–53)
HDLC SERPL-MCNC: 36 MG/DL (ref 23–92)
HGB BLD-MCNC: 15.4 G/DL (ref 13.3–17.7)
LDLC SERPL CALC-MCNC: 172 MG/DL
MCH RBC QN AUTO: 31.4 PG (ref 26.5–33)
MCHC RBC AUTO-ENTMCNC: 35.1 G/DL (ref 31.5–36.5)
MCV RBC AUTO: 89 FL (ref 78–100)
NONHDLC SERPL-MCNC: 213 MG/DL
PLATELET # BLD AUTO: 203 10E9/L (ref 150–450)
POTASSIUM SERPL-SCNC: 3.9 MMOL/L (ref 3.5–5.1)
PROT SERPL-MCNC: 7 G/DL (ref 6.4–8.9)
PSA SERPL-ACNC: 0.87 NG/ML
RBC # BLD AUTO: 4.91 10E12/L (ref 4.4–5.9)
SODIUM SERPL-SCNC: 136 MMOL/L (ref 134–144)
TRIGL SERPL-MCNC: 203 MG/DL
TSH SERPL DL<=0.05 MIU/L-ACNC: 1.57 IU/ML (ref 0.34–5.6)
WBC # BLD AUTO: 5.6 10E9/L (ref 4–11)

## 2021-05-06 PROCEDURE — G0103 PSA SCREENING: HCPCS | Mod: ZL | Performed by: INTERNAL MEDICINE

## 2021-05-06 PROCEDURE — 84443 ASSAY THYROID STIM HORMONE: CPT | Mod: ZL | Performed by: INTERNAL MEDICINE

## 2021-05-06 PROCEDURE — 36415 COLL VENOUS BLD VENIPUNCTURE: CPT | Mod: ZL | Performed by: INTERNAL MEDICINE

## 2021-05-06 PROCEDURE — 80061 LIPID PANEL: CPT | Mod: ZL | Performed by: INTERNAL MEDICINE

## 2021-05-06 PROCEDURE — 99396 PREV VISIT EST AGE 40-64: CPT | Performed by: INTERNAL MEDICINE

## 2021-05-06 PROCEDURE — 85027 COMPLETE CBC AUTOMATED: CPT | Mod: ZL | Performed by: INTERNAL MEDICINE

## 2021-05-06 PROCEDURE — 80053 COMPREHEN METABOLIC PANEL: CPT | Mod: ZL | Performed by: INTERNAL MEDICINE

## 2021-05-06 PROCEDURE — 84153 ASSAY OF PSA TOTAL: CPT | Mod: ZL | Performed by: INTERNAL MEDICINE

## 2021-05-06 RX ORDER — DOXYCYCLINE HYCLATE 100 MG
TABLET ORAL
Qty: 60 TABLET | Refills: 1 | Status: CANCELLED | OUTPATIENT
Start: 2021-05-06

## 2021-05-06 RX ORDER — LEVOTHYROXINE SODIUM 112 UG/1
112 TABLET ORAL DAILY
Qty: 90 TABLET | Refills: 3 | Status: SHIPPED | OUTPATIENT
Start: 2021-05-06 | End: 2022-06-15

## 2021-05-06 ASSESSMENT — ENCOUNTER SYMPTOMS
MYALGIAS: 0
DYSURIA: 0
FEVER: 0
VOMITING: 0
WHEEZING: 0
DIARRHEA: 0
ARTHRALGIAS: 0
CONFUSION: 0
LIGHT-HEADEDNESS: 0
DIZZINESS: 0
CHILLS: 0
AGITATION: 0
PALPITATIONS: 0
HEMATURIA: 0
BRUISES/BLEEDS EASILY: 0
SHORTNESS OF BREATH: 0
COUGH: 0
NAUSEA: 0
ABDOMINAL PAIN: 0
WOUND: 0

## 2021-05-06 ASSESSMENT — MIFFLIN-ST. JEOR: SCORE: 1663.09

## 2021-05-06 ASSESSMENT — PAIN SCALES - GENERAL: PAINLEVEL: NO PAIN (0)

## 2021-05-06 NOTE — PATIENT INSTRUCTIONS
Labs today.     Use doxycycline if needed after removing imbedded [place].     Colonoscopy - schedule in April 2022.     PSA today.     For your left hand --- Call to schedule:    [facility] - Dr. Mckeon   Phone: [phone]     Return in approximately 1 year, or sooner as needed for follow-up with Dr. You.  - Annual Follow-up / Physical     Clinic : [phone]  Appointment line: [phone]

## 2021-05-06 NOTE — LETTER
May 7, 2021      Candiod Almeida  [address] [place] [id number]        Dear ,    We are writing to inform you of your test results.    LDL cholesterol is pretty high... would consider starting Pravastatin 40 mg to 80 mg once daily.     Labs are otherwise stable.     -- Typically advise people to also take some Vitamin D3 --- about 2,000 international unit(s) daily.   This really helps reduce muscle aches and pains that are sometimes associated with starting Statins. + about 70% of the population is low on Vitamin D anyway.     Let me know what you would like to do and we can send in a prescription.     Prieto You MD     AHA 2013 Pooled Cohort Guidelines for ASCVD Risk Reduction    The 10-year ASCVD risk score (Keenan ROBB Jr., et al., 2013) is: 8.7%    Values used to calculate the score:      Age: 50 years      Sex: Male      Is Non- : No      Diabetic: No      Tobacco smoker: No      Systolic Blood Pressure: 138 mmHg      Is BP treated: No      HDL Cholesterol: 36 mg/dL      Total Cholesterol: 249 mg/dL    After review of your estimated 10 year ASCVD risk of 8.7 %, my clinical recommendations are to consider initiation of  Pravastatin.   + Continue Risk Factor Modification and Lifestyle Modifications     Last Lipids:  Recent Labs   Lab Test 05/06/21  1427 06/08/18  0724 04/17/17  0929   CHOL 249* 229*  --    HDL 36 36 33   * 161* 146*   TRIG 203* 159* 241*       Your estimated cardiovascular event risk is noted above, as calculated by the risk calculator.     High-Intensity Statin  - Daily dose lowers LDL-C, on average by approximately ?50%    Atorvastatin 40*-80 mg  Rosuvastatin 20-(40) mg    Moderate-Intensity Statin  - Daily dose lowers LDL-C, on average by approximately 30% to <50%    Atorvastatin 10-(20) mg  Fluvastatin 40 mg bid  Fluvastatin XL 80 mg  Lovastatin 40 mg  Pitavastatin 2-4 mg  ---> Pravastatin 40-(80) mg  Rosuvastatin (5)-10 mg  Simvastatin  20-40 mg**    Low-Intensity Statin  - Daily dose lowers LDL-C, on average by approximately <30%    Fluvastatin 20-40 mg  Lovastatin 20 mg  Simvastatin 10 mg  Pitavastatin 1 mg  Pravastatin 10-20 mg    Here is some additional information, if this helps you decide on STATIN therapy or dose adjustment of your statin medication.     Categories:   Group 1 -- includes individuals with established heart disease.  -- If <75 yr of age, high-intensity statin therapy indicated  -- High-intensity statin therapy consists only of atorvastatin 40-80 mg or rosuvastatin 20-40 mg; goal 50% reduction in low density lipoprotein cholesterol (LDL-C);     Group 2 -- includes individuals with familial hyperlipidemia.   -- High-intensity statin therapy indicated; if >75 yr of age, moderate-intensity statin  therapy acceptable    Group 3 -- includes diabetics  -- For diabetics with vascular disease,high-intensity statin therapy indicated  -- if >40 yr of age, moderate-intensity statin therapy indicated    Group 4 -- includes individuals without disease but at risk for disease; use pooled risk calculator to determine risk over next 10 yr  -- If risk >7.5%, moderate- to high-intensity statin therapy indicated  -- If risk is 5 to 7.5%, consider moderate intensity statin if: LDL > 160 mg/dl, family history, hs CRP > 2, CAC >300 or 75% of estimated for age, LORRIE < 0.9, or high lifetime risk.   -- All patients -- therapeutic lifestyle changes (ie, diet, weight loss, exercise) indicated    History of guidelines: [place] Simvastatin Survival Study (4S), Long-Term Intervention with Pravastatin in Ischemic Disease (LIPID) trial,and Cholesterol and Recurrent Events (CARE) trial found that event reduction paralleled drop in LDL-C; presently, if patient has heart disease, high-intensity statin therapy indicated    Basis for recommendation of high-intensity statin therapy: angiographic data;   - Reversal of Atherosclerosis with Lipitor (REVERSAL)  trial -- found that LDL-C lowered to?100 mg/dL in patients on pravastatin 40 mg, whereas LDL-C lowered to  80 mg/dL in patients on atorvastatin 80 mg.   -- at 18 mo, shrinkage and stabilization of plaque and enlargement of lumen observed with atorvastatin    - Study to Evaluate the Effect of Rosuvastatin on Intravascular Ultrasound-Derived Coronary AtheromaBurden (ASTEROID) -- found LDL-C lowered to 60 mg/dL after 24 mo, with change in volume and even some regression of atheroma observed;     Treating to New Targets (RAIN) study -- patients with coronary heart disease randomized to: atorvastatin 10 mg (moderate intensity) vs atorvastatin 80 mg (high intensity); study found that lowered LDL-C associated with event reduction of 22%;     -- In all previous studies, cardiovascular (CV) deaths more prevalent than non-CV deaths, but in RAIN study, more non-CV deaths than CV deaths seen.   -- Study showed benefit of high-intensity statin therapy in patients with coronary heart disease    Statin therapy in primary prevention:   [facility] [place] Coronary Prevention Study(WOSCOPS) -- looked at middle-aged men (average age 55 yr).   -- Pravastatin 40 mg associated with event reduction of ?30% at 5 yr.   -- 20 year follow-up study in 1995 found 20-yr mortality reduced by 27%, all-cause mortality reduced by 13%, need for revascularization reduced by 19%, and heart failure reduced by 31% (with no effect on stroke).   -- Even low to moderate-intensity statin therapy has mortality benefit over long term;    [facility]/[place] Coronary Atherosclerosis Prevention Study (AFCAPS/TexCAPS) -- found that lovastatin effective in primary prevention; moderate intensity statin therapy appears to be effective in primary prevention.     Justification for the Use of Statins in Primary Prevention: An Intervention Trial Evaluating  Rosuvastatin (JAMES) -- trial found that rosuvastatin 20 mg reduced CV risk by 44% compared with placebo; LDL-C decreased  by 50%; high-density lipoprotein cholesterol (HDLC) increased slightly, and C-reactive protein decreased by one third; mortality improved by 20%; number needed to treat 25 (cost-effective); rate of diabetes increased slightly (by 25%).   -- Diabetics and individuals with history of stent or bypass should receive high-intensity statin therapy     Resulted Orders   PSA Screen GH   Result Value Ref Range    PSA Screen 0.874 <3.100 ng/mL      Comment:      The DXI Access PSAS WHO assay is a two site immunoenzymatic assay. Assay   values obtained with different assay methods cannot be used interchangeably   due to differences in assay methods and reagent specificity.     TSH Reflex GH   Result Value Ref Range    TSH Reflex 1.57 0.34 - 5.60 IU/mL   Comprehensive metabolic panel   Result Value Ref Range    Sodium 136 134 - 144 mmol/L    Potassium 3.9 3.5 - 5.1 mmol/L    Chloride 101 98 - 107 mmol/L    Carbon Dioxide 28 21 - 31 mmol/L    Anion Gap 7 3 - 14 mmol/L    Glucose 110 (H) 70 - 105 mg/dL    Urea Nitrogen 23 7 - 25 mg/dL    Creatinine 0.83 0.70 - 1.30 mg/dL    GFR Estimate >90 >60 mL/min/[1.73_m2]    GFR Estimate If Black >90 >60 mL/min/[1.73_m2]    Calcium 9.3 8.6 - 10.3 mg/dL    Bilirubin Total 0.7 0.3 - 1.0 mg/dL    Albumin 4.6 3.5 - 5.7 g/dL    Protein Total 7.0 6.4 - 8.9 g/dL    Alkaline Phosphatase 46 34 - 104 U/L    ALT 22 7 - 52 U/L    AST 19 13 - 39 U/L   CBC with platelets   Result Value Ref Range    WBC 5.6 4.0 - 11.0 10e9/L    RBC Count 4.91 4.4 - 5.9 10e12/L    Hemoglobin 15.4 13.3 - 17.7 g/dL    Hematocrit 43.9 40.0 - 53.0 %    MCV 89 78 - 100 fl    MCH 31.4 26.5 - 33.0 pg    MCHC 35.1 31.5 - 36.5 g/dL    RDW 12.3 10.0 - 15.0 %    Platelet Count 203 150 - 450 10e9/L   Lipid Profile   Result Value Ref Range    Cholesterol 249 (H) <200 mg/dL    Triglycerides 203 (H) <150 mg/dL      Comment:      Borderline high:  150-199 mg/dl  High:             200-499 mg/dl  Very high:       >499 mg/dl      HDL  Cholesterol 36 23 - 92 mg/dL    LDL Cholesterol Calculated 172 (H) <100 mg/dL      Comment:      Above desirable:  100-129 mg/dl  Borderline High:  130-159 mg/dL  High:             160-189 mg/dL  Very high:       >189 mg/dl      Non HDL Cholesterol 213 (H) <130 mg/dL      Comment:      Above Desirable:  130-159 mg/dl  Borderline high:  160-189 mg/dl  High:             190-219 mg/dl  Very high:       >219 mg/dl         If you have any questions or concerns, please call the clinic at the number listed above.       Sincerely,      Prieto You MD

## 2021-05-06 NOTE — PROGRESS NOTES
Medication Reconciliation: complete  Yadi LKarla Bates LPN  5/6/2021 1:53 PM    SUBJECTIVE:   CC: Candido Almeida is an 50 year old male who presents for preventative health visit.   Patient has been advised of split billing requirements and indicates understanding: Yes  HPI    Today's PHQ-2 Score:   PHQ-2 ( 1999 [employer]) 5/6/2021   Q1: Little interest or pleasure in doing things 0   Q2: Feeling down, depressed or hopeless 0   PHQ-2 Score 0       Abuse: Current or Past(Physical, Sexual or Emotional)- No  Do you feel safe in your environment? Yes      Social History     Tobacco Use     Smoking status: Never Smoker     Smokeless tobacco: Current User     Types: Chew   Substance Use Topics     Alcohol use: Yes     Alcohol/week: 4.0 standard drinks     Comment: occasional       Last PSA: No results found for: PSA    Reviewed orders with patient. Reviewed health maintenance and updated orders accordingly - Yes    Reviewed and updated as needed this visit by clinical staff  Tobacco  Allergies  Meds  Problems  Med Hx  Surg Hx  Fam Hx  Soc Hx          Reviewed and updated as needed this visit by Provider  Tobacco  Allergies  Meds  Problems  Med Hx  Surg Hx  Fam Hx           Review of Systems   Constitutional: Negative for chills and fever.   HENT: Negative for congestion and hearing loss.    Eyes: Negative for visual disturbance.   Respiratory: Negative for cough, shortness of breath and wheezing.    Cardiovascular: Negative for chest pain and palpitations.   Gastrointestinal: Negative for abdominal pain, diarrhea, nausea and vomiting.   Endocrine: Negative for cold intolerance and heat intolerance.   Genitourinary: Negative for dysuria and hematuria.   Musculoskeletal: Negative for arthralgias and myalgias.   Skin: Negative for rash and wound.   Allergic/Immunologic: Negative for immunocompromised state.   Neurological: Negative for dizziness and light-headedness.   Hematological: Does not bruise/bleed  "easily.   Psychiatric/Behavioral: Negative for agitation and confusion.        OBJECTIVE:   /52 (BP Location: Right arm, Patient Position: Sitting, Cuff Size: Adult Regular)   Pulse 81   Temp 97.5  F (36.4  C) (Tympanic)   Resp 16   Ht 1.78 m (5' 10.08\")   Wt 80.6 kg (177 lb 9.6 oz)   SpO2 96%   BMI 25.43 kg/m      Physical Exam  Constitutional:       General: He is not in acute distress.     Appearance: He is well-developed. He is not diaphoretic.   HENT:      Head: Normocephalic and atraumatic.   Eyes:      General: No scleral icterus.     Conjunctiva/sclera: Conjunctivae normal.   Neck:      Musculoskeletal: Neck supple.      Vascular: No carotid bruit.   Cardiovascular:      Rate and Rhythm: Normal rate and regular rhythm.      Pulses: Normal pulses.   Pulmonary:      Effort: Pulmonary effort is normal.      Breath sounds: Normal breath sounds.   Abdominal:      Palpations: Abdomen is soft.      Tenderness: There is no abdominal tenderness.   Musculoskeletal:         General: Deformity (Dupuytren's contracture of both hands) present.      Right lower leg: No edema.      Left lower leg: No edema.   Lymphadenopathy:      Cervical: No cervical adenopathy.   Skin:     General: Skin is warm and dry.      Findings: No rash.   Neurological:      Mental Status: He is alert and oriented to person, place, and time. Mental status is at baseline.   Psychiatric:         Mood and Affect: Mood normal.         Behavior: Behavior normal.           Diagnostic Test Results:  Labs reviewed in Epic  Results for orders placed or performed in visit on 05/06/21   CBC with platelets     Status: None   Result Value Ref Range    WBC 5.6 4.0 - 11.0 10e9/L    RBC Count 4.91 4.4 - 5.9 10e12/L    Hemoglobin 15.4 13.3 - 17.7 g/dL    Hematocrit 43.9 40.0 - 53.0 %    MCV 89 78 - 100 fl    MCH 31.4 26.5 - 33.0 pg    MCHC 35.1 31.5 - 36.5 g/dL    RDW 12.3 10.0 - 15.0 %    Platelet Count 203 150 - 450 10e9/L        ASSESSMENT/PLAN: "       ICD-10-CM    1. Annual physical exam  Z00.00    2. At high risk for tick borne illness  Z91.89    3. Hypothyroidism due to acquired atrophy of thyroid  E03.4 CBC with platelets     Comprehensive metabolic panel     TSH Reflex GH     TSH Reflex GH     Comprehensive metabolic panel     CBC with platelets     levothyroxine (SYNTHROID/LEVOTHROID) 112 MCG tablet   4. Colon cancer screening  Z12.11 GASTROENTEROLOGY ADULT REF PROCEDURE ONLY   5. Encounter for screening for malignant neoplasm of prostate  Z12.5 PSA Screen GH     PSA Screen GH   6. Mixed hyperlipidemia  E78.2 Lipid Profile     Lipid Profile   7. Dupuytren's contracture of both hands  M72.0      Patient presents for annual physical.  Overall doing fairly well.    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  He is high risk for tickborne illness.  Has had multiple tick bites per year in the past.  He still has some doxycycline left at home advised to take 2 tablets after embedded tick.    Hypothyroidism, taking oral replacement.  Needs refills.  Check labs.    Colon cancer screening, colonoscopy will be due in April 2022.  Orders placed.    Screening PSA today.    Mixed hyperlipidemia, cholesterol levels were relatively high last time he had his lab work.  We did discuss the possibility of statin therapy.  He plans to check labs today and see how things look and then proceed with thereafter.    Bilateral Dupuytren's contracture of both hands, left fifth finger is most affected.  Phone number and contact information given to patient to call to schedule with Dr. Mckeon, hand surgery.    Patient has been advised of split billing requirements and indicates understanding: Yes  COUNSELING:   Reviewed preventive health counseling, as reflected in patient instructions  Special attention given to:        Regular exercise       Healthy diet/nutrition       Vision screening       Hearing  "screening       Immunizations    Estimated body mass index is 25.43 kg/m  as calculated from the following:    Height as of this encounter: 1.78 m (5' 10.08\").    Weight as of this encounter: 80.6 kg (177 lb 9.6 oz).         He reports that he has never smoked. His smokeless tobacco use includes chew.  Tobacco Cessation Action Plan:   Self help information given to patient      Counseling Resources:  ATP IV Guidelines  Pooled Cohorts Equation Calculator  FRAX Risk Assessment  ICSI Preventive Guidelines  Dietary Guidelines for Americans, 2010  USDA's MyPlate  ASA Prophylaxis  Lung CA Screening    Prieto You MD  [facility]  "

## 2021-05-14 ENCOUNTER — MYC MEDICAL ADVICE (OUTPATIENT)
Dept: INTERNAL MEDICINE | Facility: OTHER | Age: 51
End: 2021-05-14

## 2021-05-14 DIAGNOSIS — E78.49 FAMILIAL HYPERLIPIDEMIA: Primary | ICD-10-CM

## 2021-05-18 ENCOUNTER — TELEPHONE (OUTPATIENT)
Dept: INTERNAL MEDICINE | Facility: OTHER | Age: 51
End: 2021-05-18

## 2021-05-18 DIAGNOSIS — R73.9 ELEVATED RANDOM BLOOD GLUCOSE LEVEL: Primary | ICD-10-CM

## 2021-05-18 NOTE — TELEPHONE ENCOUNTER
Pt would like to add something to upcoming labs.  Please call      Todd Dee on 5/18/2021 at 9:38 AM

## 2021-05-18 NOTE — TELEPHONE ENCOUNTER
Patient would like to get the A1C drawn on Friday. Yadi Bates LPN .............5/18/2021  9:40 AM

## 2021-05-21 DIAGNOSIS — R73.9 ELEVATED RANDOM BLOOD GLUCOSE LEVEL: ICD-10-CM

## 2021-05-21 DIAGNOSIS — E78.49 FAMILIAL HYPERLIPIDEMIA: ICD-10-CM

## 2021-05-21 LAB
CRP SERPL HS-MCNC: 1.22 MG/L (ref 0.2–160)
HBA1C MFR BLD: 5.3 % (ref 4–6)

## 2021-05-21 PROCEDURE — 83695 ASSAY OF LIPOPROTEIN(A): CPT | Mod: ZL | Performed by: INTERNAL MEDICINE

## 2021-05-21 PROCEDURE — 36415 COLL VENOUS BLD VENIPUNCTURE: CPT | Mod: ZL | Performed by: INTERNAL MEDICINE

## 2021-05-21 PROCEDURE — 83036 HEMOGLOBIN GLYCOSYLATED A1C: CPT | Mod: ZL | Performed by: INTERNAL MEDICINE

## 2021-05-21 PROCEDURE — 86141 C-REACTIVE PROTEIN HS: CPT | Mod: ZL | Performed by: INTERNAL MEDICINE

## 2021-05-21 PROCEDURE — 83090 ASSAY OF HOMOCYSTEINE: CPT | Mod: ZL | Performed by: INTERNAL MEDICINE

## 2021-05-23 LAB — LPA SERPL-MCNC: 36 MG/DL

## 2021-05-28 LAB — HCYS SERPL-SCNC: 6.5 UMOL/L (ref 4–12)

## 2021-07-02 ENCOUNTER — OFFICE VISIT (OUTPATIENT)
Dept: ORTHOPEDICS | Facility: OTHER | Age: 51
End: 2021-07-02
Attending: SPECIALIST
Payer: COMMERCIAL

## 2021-07-02 VITALS
HEART RATE: 60 BPM | WEIGHT: 176 LBS | DIASTOLIC BLOOD PRESSURE: 84 MMHG | BODY MASS INDEX: 25.2 KG/M2 | SYSTOLIC BLOOD PRESSURE: 124 MMHG

## 2021-07-02 DIAGNOSIS — M72.0 DUPUYTREN'S CONTRACTURE OF BOTH HANDS: Primary | ICD-10-CM

## 2021-07-02 PROCEDURE — 99203 OFFICE O/P NEW LOW 30 MIN: CPT | Performed by: SPECIALIST

## 2021-07-02 NOTE — PROGRESS NOTES
Visit Date: 2021    HISTORY OF PRESENT ILLNESS:  Roque is a 51-year-old right hand dominant  I am seeing today for evaluation of progressive contracture of his left ring and left small finger.  He does have a cyst.  Family history of Dupuytren's and also has ledderhoses.  The patient has noted progressive contracture.  He is otherwise healthy.    Past medical history, past surgical history and medications:  Reviewed.    ALLERGIES:  REVIEWED.    PHYSICAL EXAMINATION:  This is 51-year-old male who is alert and oriented x3. Appropriate gait and station. Appropriate weight 176 pounds, /84, pulse 60 and regular.  Examination of both upper extremities reveals full and symmetric range of motion, shoulders, elbows.  Examination of both hands and wrists shows no evidence of atrophic skin change, adenopathy, or focal weakness.  His left hand reveals a 40 to 45 degree PIP joint contracture.  He has a pretendinous cord extending to the mid palm.  Right hand shows Dupuytren's nodules of the index and small finger.  These are quiescent.    X-RAYS:  None were obtained.    IMPRESSION AND PLAN:  Dupuytren's contracture, which meet surgical criteria.  We discussed proceeding with digital and palmar fasciectomy of the small finger on the left.  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  We will have him come in for review, visit prior to surgery.    Shawn Mckeon MD        D: 2021   T: 2021   MT: christine    Name:     RAMANA RAPP  MRN:      [id number]        Account:    [id number]   :      1970           Visit Date: 2021     Document: [id number]

## 2021-07-02 NOTE — PROGRESS NOTES
Patient is here for consult on his left hand.  Korin Agudelo LPN .....................7/2/2021 8:08 AM

## 2021-10-09 ENCOUNTER — HEALTH MAINTENANCE LETTER (OUTPATIENT)
Age: 51
End: 2021-10-09

## 2021-10-15 ENCOUNTER — OFFICE VISIT (OUTPATIENT)
Dept: ORTHOPEDICS | Facility: OTHER | Age: 51
End: 2021-10-15
Attending: SPECIALIST
Payer: COMMERCIAL

## 2021-10-15 DIAGNOSIS — M72.0 DUPUYTREN'S CONTRACTURE OF BOTH HANDS: Primary | ICD-10-CM

## 2021-10-15 PROCEDURE — 99213 OFFICE O/P EST LOW 20 MIN: CPT | Performed by: SPECIALIST

## 2021-10-15 NOTE — PROGRESS NOTES
Visit Date: 10/15/2021    SUBJECTIVE:  Roque returns for followup with respect to his Dupuytren's contracture of his left small finger.  The patient has a mild contracture of the ring finger, but the left hand shows increasing contracture.  He would like to discuss surgical treatment.    PHYSICAL EXAMINATION:  Reveals a 51-year-old male, alert and oriented x3 and appropriate.  Gait and station are appropriate, well-groomed and well kempt.  Examination of both upper extremities reveals full and symmetric range of motion, shoulders, elbows.  Examination of both hands and wrists shows a 50-degree PIP joint contracture of the left small finger with pretendinous cords extending to the mid palm.  Right hand shows nodules of the ring and small finger.    IMAGING:  No x-rays were obtained.    IMPRESSION AND PLAN:  Dupuytren's contracture, which meet surgical criteria.  We discussed proceeding with digital and palmar fasciectomies.  Risks, complications, and benefits were reviewed.  He is leaning towards doing this in the spring and this will be scheduled at his convenience.    Shawn Mckeon MD        D: 10/15/2021   T: 10/15/2021   MT: [email or web address]    Name:     RAMANA RAPP  MRN:      [id number]        Account:    [id number]   :      1970           Visit Date: 10/15/2021     Document: [id number]

## 2021-11-10 ENCOUNTER — ALLIED HEALTH/NURSE VISIT (OUTPATIENT)
Dept: FAMILY MEDICINE | Facility: OTHER | Age: 51
End: 2021-11-10
Attending: INTERNAL MEDICINE
Payer: COMMERCIAL

## 2021-11-10 DIAGNOSIS — Z23 NEED FOR PROPHYLACTIC VACCINATION AND INOCULATION AGAINST INFLUENZA: Primary | ICD-10-CM

## 2021-11-10 PROCEDURE — 90471 IMMUNIZATION ADMIN: CPT

## 2021-11-10 PROCEDURE — 90682 RIV4 VACC RECOMBINANT DNA IM: CPT

## 2021-11-26 ENCOUNTER — IMMUNIZATION (OUTPATIENT)
Dept: FAMILY MEDICINE | Facility: OTHER | Age: 51
End: 2021-11-26
Attending: FAMILY MEDICINE
Payer: COMMERCIAL

## 2021-11-26 PROCEDURE — 91300 PR COVID VAC PFIZER DIL RECON 30 MCG/0.3 ML IM: CPT

## 2021-11-26 PROCEDURE — 0004A PR COVID VAC PFIZER DIL RECON 30 MCG/0.3 ML IM: CPT

## 2022-03-07 ENCOUNTER — MYC MEDICAL ADVICE (OUTPATIENT)
Dept: INTERNAL MEDICINE | Facility: OTHER | Age: 52
End: 2022-03-07
Payer: COMMERCIAL

## 2022-03-07 DIAGNOSIS — Z12.11 COLON CANCER SCREENING: Primary | ICD-10-CM

## 2022-03-07 NOTE — TELEPHONE ENCOUNTER
Patient looking to schedule a colonoscopy.  Had a negative Cologuard 6/23/2019. Pended colonoscopy.  Michelle Álvarez LPN on 3/7/2022 at 10:38 AM

## 2022-03-23 DIAGNOSIS — Z12.11 SCREENING FOR COLON CANCER: Primary | ICD-10-CM

## 2022-03-23 NOTE — TELEPHONE ENCOUNTER
Screening Questions for the Scheduling of Screening Colonoscopies   (If Colonoscopy is diagnostic, Provider should review the chart before scheduling.)  Are you younger than 50 or older than 80?  NO  Do you take aspirin or fish oil?  YES FISH OIL (if yes, tell patient to stop 1 week prior to Colonoscopy)  Do you take warfarin (Coumadin), clopidogrel (Plavix), apixaban (Eliquis), dabigatram (Pradaxa), rivaroxaban (Xarelto) or any blood thinner? NO  Do you use oxygen at home?  NO  Do you have kidney disease? NO  Are you on dialysis? NO  Have you had a stroke or heart attack in the last year? NO  Have you had a stent in your heart or any blood vessel in the last year? NO  Have you had a transplant of any organ? NO  Have you had a colonoscopy or upper endoscopy (EGD) before? NO         When?  NA  Date of scheduled Colonoscopy. 04/12/2022  Provider [facility]  Pharmacy [facility]

## 2022-03-28 RX ORDER — POLYETHYLENE GLYCOL 3350, SODIUM CHLORIDE, SODIUM BICARBONATE, POTASSIUM CHLORIDE 420; 11.2; 5.72; 1.48 G/4L; G/4L; G/4L; G/4L
4000 POWDER, FOR SOLUTION ORAL ONCE
Qty: 4000 ML | Refills: 0 | Status: SHIPPED | OUTPATIENT
Start: 2022-03-28 | End: 2022-03-28

## 2022-03-28 RX ORDER — BISACODYL 5 MG
TABLET, DELAYED RELEASE (ENTERIC COATED) ORAL
Qty: 2 TABLET | Refills: 0 | Status: SHIPPED | OUTPATIENT
Start: 2022-03-28 | End: 2022-04-12

## 2022-04-07 ENCOUNTER — OFFICE VISIT (OUTPATIENT)
Dept: INTERNAL MEDICINE | Facility: OTHER | Age: 52
End: 2022-04-07
Attending: INTERNAL MEDICINE
Payer: COMMERCIAL

## 2022-04-07 VITALS
BODY MASS INDEX: 25.14 KG/M2 | TEMPERATURE: 97.8 F | HEART RATE: 64 BPM | SYSTOLIC BLOOD PRESSURE: 120 MMHG | HEIGHT: 70 IN | OXYGEN SATURATION: 94 % | DIASTOLIC BLOOD PRESSURE: 66 MMHG | RESPIRATION RATE: 16 BRPM | WEIGHT: 175.6 LBS

## 2022-04-07 DIAGNOSIS — Z82.49 FAMILY HISTORY OF HEART DISEASE IN MALE FAMILY MEMBER BEFORE AGE 55: ICD-10-CM

## 2022-04-07 DIAGNOSIS — E55.9 VITAMIN D DEFICIENCY: ICD-10-CM

## 2022-04-07 DIAGNOSIS — E03.4 HYPOTHYROIDISM DUE TO ACQUIRED ATROPHY OF THYROID: ICD-10-CM

## 2022-04-07 DIAGNOSIS — Z71.85 VACCINE COUNSELING: ICD-10-CM

## 2022-04-07 DIAGNOSIS — Z01.818 PREOP GENERAL PHYSICAL EXAM: Primary | ICD-10-CM

## 2022-04-07 DIAGNOSIS — E78.49 FAMILIAL HYPERLIPIDEMIA: ICD-10-CM

## 2022-04-07 DIAGNOSIS — M24.542 CONTRACTURE OF FINGER JOINT, LEFT: ICD-10-CM

## 2022-04-07 DIAGNOSIS — Z00.00 ANNUAL PHYSICAL EXAM: ICD-10-CM

## 2022-04-07 DIAGNOSIS — Z12.5 ENCOUNTER FOR SCREENING FOR MALIGNANT NEOPLASM OF PROSTATE: ICD-10-CM

## 2022-04-07 LAB
ALBUMIN SERPL-MCNC: 4.7 G/DL (ref 3.5–5.7)
ALP SERPL-CCNC: 48 U/L (ref 34–104)
ALT SERPL W P-5'-P-CCNC: 18 U/L (ref 7–52)
ANION GAP SERPL CALCULATED.3IONS-SCNC: 5 MMOL/L (ref 3–14)
AST SERPL W P-5'-P-CCNC: 21 U/L (ref 13–39)
ATRIAL RATE - MUSE: 59 BPM
BILIRUB SERPL-MCNC: 0.7 MG/DL (ref 0.3–1)
BUN SERPL-MCNC: 20 MG/DL (ref 7–25)
CALCIUM SERPL-MCNC: 9.9 MG/DL (ref 8.6–10.3)
CHLORIDE BLD-SCNC: 102 MMOL/L (ref 98–107)
CHOLEST SERPL-MCNC: 246 MG/DL
CO2 SERPL-SCNC: 31 MMOL/L (ref 21–31)
CREAT SERPL-MCNC: 0.88 MG/DL (ref 0.7–1.3)
DEPRECATED CALCIDIOL+CALCIFEROL SERPL-MC: 75 UG/L (ref 30–100)
DIASTOLIC BLOOD PRESSURE - MUSE: NORMAL MMHG
ERYTHROCYTE [DISTWIDTH] IN BLOOD BY AUTOMATED COUNT: 12.6 % (ref 10–15)
FASTING STATUS PATIENT QL REPORTED: YES
GFR SERPL CREATININE-BSD FRML MDRD: >90 ML/MIN/1.73M2
GLUCOSE BLD-MCNC: 99 MG/DL (ref 70–105)
HCT VFR BLD AUTO: 45 % (ref 40–53)
HDLC SERPL-MCNC: 44 MG/DL (ref 23–92)
HGB BLD-MCNC: 15.3 G/DL (ref 13.3–17.7)
INTERPRETATION ECG - MUSE: NORMAL
LDLC SERPL CALC-MCNC: 183 MG/DL
MCH RBC QN AUTO: 30.5 PG (ref 26.5–33)
MCHC RBC AUTO-ENTMCNC: 34 G/DL (ref 31.5–36.5)
MCV RBC AUTO: 90 FL (ref 78–100)
NONHDLC SERPL-MCNC: 202 MG/DL
P AXIS - MUSE: 67 DEGREES
PLATELET # BLD AUTO: 209 10E3/UL (ref 150–450)
POTASSIUM BLD-SCNC: 4.2 MMOL/L (ref 3.5–5.1)
PR INTERVAL - MUSE: 178 MS
PROT SERPL-MCNC: 7.3 G/DL (ref 6.4–8.9)
PSA SERPL-MCNC: 0.95 UG/L (ref 0–4)
QRS DURATION - MUSE: 98 MS
QT - MUSE: 398 MS
QTC - MUSE: 394 MS
R AXIS - MUSE: 62 DEGREES
RBC # BLD AUTO: 5.01 10E6/UL (ref 4.4–5.9)
SODIUM SERPL-SCNC: 138 MMOL/L (ref 134–144)
SYSTOLIC BLOOD PRESSURE - MUSE: NORMAL MMHG
T AXIS - MUSE: 53 DEGREES
TRIGL SERPL-MCNC: 94 MG/DL
TSH SERPL DL<=0.005 MIU/L-ACNC: 1.39 MU/L (ref 0.4–4)
VENTRICULAR RATE- MUSE: 59 BPM
WBC # BLD AUTO: 5.8 10E3/UL (ref 4–11)

## 2022-04-07 PROCEDURE — 36415 COLL VENOUS BLD VENIPUNCTURE: CPT | Mod: ZL | Performed by: INTERNAL MEDICINE

## 2022-04-07 PROCEDURE — 84443 ASSAY THYROID STIM HORMONE: CPT | Mod: ZL | Performed by: INTERNAL MEDICINE

## 2022-04-07 PROCEDURE — 82306 VITAMIN D 25 HYDROXY: CPT | Mod: ZL | Performed by: INTERNAL MEDICINE

## 2022-04-07 PROCEDURE — 80053 COMPREHEN METABOLIC PANEL: CPT | Mod: ZL | Performed by: INTERNAL MEDICINE

## 2022-04-07 PROCEDURE — 93000 ELECTROCARDIOGRAM COMPLETE: CPT | Performed by: INTERNAL MEDICINE

## 2022-04-07 PROCEDURE — 84153 ASSAY OF PSA TOTAL: CPT | Mod: ZL | Performed by: INTERNAL MEDICINE

## 2022-04-07 PROCEDURE — 85027 COMPLETE CBC AUTOMATED: CPT | Mod: ZL | Performed by: INTERNAL MEDICINE

## 2022-04-07 PROCEDURE — 80061 LIPID PANEL: CPT | Mod: ZL | Performed by: INTERNAL MEDICINE

## 2022-04-07 RX ORDER — CHOLECALCIFEROL (VITAMIN D3) 50 MCG
1 TABLET ORAL DAILY
Qty: 90 TABLET | Refills: 3 | Status: SHIPPED | OUTPATIENT
Start: 2022-04-07 | End: 2022-04-07

## 2022-04-07 RX ORDER — ROSUVASTATIN CALCIUM 10 MG/1
10 TABLET, COATED ORAL DAILY
Qty: 90 TABLET | Refills: 1 | Status: SHIPPED | OUTPATIENT
Start: 2022-04-07 | End: 2022-10-05

## 2022-04-07 ASSESSMENT — ENCOUNTER SYMPTOMS
DYSURIA: 0
NAUSEA: 0
ABDOMINAL PAIN: 0
BRUISES/BLEEDS EASILY: 0
VOMITING: 0
COUGH: 0
PALPITATIONS: 0
DIARRHEA: 0
DIZZINESS: 0
FEVER: 0
CHILLS: 0
SHORTNESS OF BREATH: 0
WOUND: 0
ARTHRALGIAS: 0
WHEEZING: 0
HEMATURIA: 0
AGITATION: 0
MYALGIAS: 0
CONFUSION: 0
LIGHT-HEADEDNESS: 0

## 2022-04-07 ASSESSMENT — PAIN SCALES - GENERAL: PAINLEVEL: NO PAIN (0)

## 2022-04-07 NOTE — PROGRESS NOTES
"[facility]  [address]  [place] [address]  Phone: [phone]  Primary Provider: Prieto You    PREOPERATIVE EVALUATION:  Today's date: 4/7/2022    Candido Almeida is a 51 year old male who presents for a preoperative evaluation.  Chief Complaint   Patient presents with     Pre-Op Exam     L small finger contracture repair, 4/22/22, [place]       Initial /66 (BP Location: Right arm, Patient Position: Sitting, Cuff Size: Adult Regular)   Pulse 64   Temp 97.8  F (36.6  C) (Temporal)   Resp 16   Ht 1.778 m (5' 10\")   Wt 79.7 kg (175 lb 9.6 oz)   SpO2 94%   BMI 25.20 kg/m   Estimated body mass index is 25.2 kg/m  as calculated from the following:    Height as of this encounter: 1.778 m (5' 10\").    Weight as of this encounter: 79.7 kg (175 lb 9.6 oz).     Medication Reconciliation: complete      FOOD SECURITY SCREENING QUESTIONS:    The next two questions are to help us understand your food security.  If you are feeling you need any assistance in this area, we have resources available to support you today.    Hunger Vital Signs:  Within the past 12 months we worried whether our food would run out before we got money to buy more. Never  Within the past 12 months the food we bought just didn't last and we didn't have money to get more. Never     Advance care plan reviewed    Michelle Álvarez LPN,LPN on 4/7/2022 at 10:10 AM    Surgical Information:  Surgery/Procedure: L small finger contracture repair  Surgery Location: [facility]  Surgeon: Dr. Puckett  Surgery Date: 4/22/22  Time of Surgery:   Where patient plans to recover: At home with family  Fax number for surgical facility: [facility]    Type of Anesthesia Anticipated: Choice    Assessment & Plan     The proposed surgical procedure is considered LOW risk.      ICD-10-CM    1. Preop general physical exam  Z01.818 EKG 12-lead, tracing only   2. Contracture of finger joint, left  M24.542    3. Familial " hyperlipidemia  E78.49 Lipid Profile     rosuvastatin (CRESTOR) 10 MG tablet     Lipid Profile   4. Family history of heart disease in male family member before age 55  Z82.49    5. Hypothyroidism due to acquired atrophy of thyroid  E03.4 TSH with free T4 reflex     CBC with platelets     Comprehensive metabolic panel     Comprehensive metabolic panel     CBC with platelets     TSH with free T4 reflex   6. Encounter for screening for malignant neoplasm of prostate  Z12.5 Prostate Specific Antigen     Prostate Specific Antigen   7. Vitamin D deficiency  E55.9 Vitamin D Total     Vitamin D Total     DISCONTINUED: vitamin D3 (CHOLECALCIFEROL) 50 mcg (2000 units) tablet   8. Vaccine counseling  Z71.85    9. Annual physical exam  Z00.00    HPI related to upcoming procedure: Patient is now scheduled for left fifth finger contracture release.    Familial hyperlipidemia.  Previous LDL levels were high.  Recheck today have actually gone up slightly.  Patient has been trying to watch his diet.  He has lost some weight.  Very active and physically laborous job.  Lab work shows cholesterol levels have actually gone up.  We discussed cardiovascular risk reduction.  Recommend starting statin therapy.  Initiate Crestor 10 mg daily.  May need to increase dosing.  Monitor closely.    Familial history of early heart disease.  Advised significant risk especially with very high cholesterol levels.  Ideally would like to get his cholesterol down at least 50% from 186 down to 90 range or lower.  Adjust Crestor dose as tolerated.    Hypothyroidism.  Taking oral replacement.  Labs are stable.  No changes for now.    Prostate lab cancer screening today.  Check lab work.    Vitamin D deficiency.  States that he does take a multivitamin with vitamin D.  Lab work today actually shows his vitamin D levels are not low at this time.  Continue with multivitamin.  Prescription for vitamin D initially sent to pharmacy but this is now  canceled.    Vaccine counseling:  Encouraged COVID-19 booster shot when available.    Risks and Recommendations:  The patient has the following additional risks and recommendations for perioperative complications:   - No identified additional risk factors other than previously addressed    Medication Instructions:  Patient is to take all scheduled medications on the day of surgery    RECOMMENDATION:  APPROVAL GIVEN to proceed with proposed procedure, without further diagnostic evaluation.    Subjective     Preop Questions 4/6/2022   1. Have you ever had a heart attack or stroke? No   2. Have you ever had surgery on your heart or blood vessels, such as a stent placement, a coronary artery bypass, or surgery on an artery in your head, neck, heart, or legs? No   3. Do you have chest pain with activity? No   4. Do you have a history of  heart failure? No   5. Do you currently have a cold, bronchitis or symptoms of other infection? No   6. Do you have a cough, shortness of breath, or wheezing? No   7. Do you or anyone in your family have previous history of blood clots? No   8. Do you or does anyone in your family have a serious bleeding problem such as prolonged bleeding following surgeries or cuts? No   9. Have you ever had problems with anemia or been told to take iron pills? No   10. Have you had any abnormal blood loss such as black, tarry or bloody stools? No   11. Have you ever had a blood transfusion? No   12. Are you willing to have a blood transfusion if it is medically needed before, during, or after your surgery? Yes   13. Have you or any of your relatives ever had problems with anesthesia? No   14. Do you have sleep apnea, excessive snoring or daytime drowsiness? No   15. Do you have any artifical heart valves or other implanted medical devices like a pacemaker, defibrillator, or continuous glucose monitor? No   16. Do you have artificial joints? No   17. Are you allergic to latex? No       Health Care  Directive:  Patient has a Health Care Directive on file      Preoperative Review of :   reviewed - no record of controlled substances prescribed.    Review of Systems   Constitutional: Negative for chills and fever.   HENT: Negative for congestion and hearing loss.    Eyes: Negative for visual disturbance.   Respiratory: Negative for cough, shortness of breath and wheezing.    Cardiovascular: Negative for chest pain and palpitations.   Gastrointestinal: Negative for abdominal pain, diarrhea, nausea and vomiting.   Endocrine: Negative for cold intolerance and heat intolerance.   Genitourinary: Negative for dysuria and hematuria.   Musculoskeletal: Negative for arthralgias and myalgias.        Left 5th finger contracture   Skin: Negative for rash and wound.   Allergic/Immunologic: Negative for immunocompromised state.   Neurological: Negative for dizziness and light-headedness.   Hematological: Does not bruise/bleed easily.   Psychiatric/Behavioral: Negative for agitation and confusion.       Patient Active Problem List    Diagnosis Date Noted     Contracture of finger joint, left 04/07/2022     Priority: Medium     Dupuytren's contracture of both hands 05/06/2021     Priority: Medium     Hamstring tightness of left lower extremity 04/17/2017     Priority: Medium     Right medial knee pain 04/17/2017     Priority: Medium     Hypothyroidism due to acquired atrophy of thyroid 12/01/2015     Priority: Medium     Health care maintenance 11/23/2015     Priority: Medium     Family history of heart disease in male family member before age 55 08/25/2014     Priority: Medium     Nummular dermatitis 08/25/2014     Priority: Medium     Health examination of defined subpopulation 05/23/2013     Priority: Medium     Familial hyperlipidemia 05/23/2013     Priority: Medium     Rash 05/23/2013     Priority: Medium      Past Medical History:   Diagnosis Date     At high risk for tick borne illness      Past Surgical History:  "  Procedure Laterality Date     COLONOSCOPY N/A 04/12/2022    normal colonoscopy, follow up 10 years, 4/12/2032     RELEASE DUPUYTRENS CONTRACTURE Left 4/22/2022    Procedure: Digital and Florez Fasciectomies 5th finger;  Surgeon: Shawn Mckeon MD;  Location: [facility] OR     VASECTOMY      Vasectomy     Current Outpatient Medications   Medication Sig Dispense Refill     doxycycline hyclate (VIBRA-TABS) 100 MG tablet Take 2 tablets with food following tick bite 60 tablet 1     levothyroxine (SYNTHROID/LEVOTHROID) 112 MCG tablet Take 1 tablet (112 mcg) by mouth daily 90 tablet 3     rosuvastatin (CRESTOR) 10 MG tablet Take 1 tablet (10 mg) by mouth daily - for Familial Hyperlipidemia 90 tablet 1     HYDROcodone-acetaminophen (NORCO) 5-325 MG tablet Take 1-2 tablets by mouth every 4 hours as needed for moderate to severe pain 15 tablet 0       No Known Allergies     Social History     Tobacco Use     Smoking status: Never Smoker     Smokeless tobacco: Current User     Types: Chew   Substance Use Topics     Alcohol use: Yes     Alcohol/week: 4.0 standard drinks     Comment: occasional     Family History   Problem Relation Age of Onset     Genetic Disorder Other         Genetic,Hypercholesterolemia, No FHx of DM, CVA, Cancers     Colon Cancer Other         Cancer-colon,No FHx     History   Drug Use No         Objective     /66 (BP Location: Right arm, Patient Position: Sitting, Cuff Size: Adult Regular)   Pulse 64   Temp 97.8  F (36.6  C) (Temporal)   Resp 16   Ht 1.778 m (5' 10\")   Wt 79.7 kg (175 lb 9.6 oz)   SpO2 94%   BMI 25.20 kg/m      Physical Exam  Constitutional:       General: He is not in acute distress.     Appearance: He is well-developed. He is not diaphoretic.   HENT:      Head: Normocephalic and atraumatic.   Eyes:      General: No scleral icterus.     Conjunctiva/sclera: Conjunctivae normal.   Neck:      Vascular: No carotid bruit.   Cardiovascular:      Rate and Rhythm: Normal rate and " regular rhythm.      Pulses: Normal pulses.   Pulmonary:      Effort: Pulmonary effort is normal.      Breath sounds: Normal breath sounds.   Abdominal:      Palpations: Abdomen is soft.      Tenderness: There is no abdominal tenderness.   Musculoskeletal:         General: Deformity (Dupuytren's contracture of both hands) present.      Cervical back: Neck supple.      Right lower leg: No edema.      Left lower leg: No edema.      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   Skin:     General: Skin is warm and dry.      Findings: No rash.   Neurological:      Mental Status: He is alert and oriented to person, place, and time. Mental status is at baseline.   Psychiatric:         Mood and Affect: Mood normal.         Behavior: Behavior normal.       Recent Labs   Lab Test 05/21/21  0700 05/06/21  1427   HGB  --  15.4   PLT  --  203   NA  --  136   POTASSIUM  --  3.9   CR  --  0.83   A1C 5.3  --      Diagnostics:  Results for orders placed or performed in visit on 04/07/22   Vitamin D Total     Status: Normal   Result Value Ref Range    Vitamin D, Total (25-Hydroxy) 75 30 - 100 ug/L   Prostate Specific Antigen     Status: Normal   Result Value Ref Range    PSA Tumor Marker 0.95 0.00 - 4.00 ug/L    Narrative    The [employer] Access PSAS WHO assay is a two site immunoenzymatic   assay. Assay values obtained with different assay methods cannot be used   interchangeably due to differences in assay methods and reagent specificity.   Comprehensive metabolic panel     Status: Normal   Result Value Ref Range    Sodium 138 134 - 144 mmol/L    Potassium 4.2 3.5 - 5.1 mmol/L    Chloride 102 98 - 107 mmol/L    Carbon Dioxide (CO2) 31 21 - 31 mmol/L    Anion Gap 5 3 - 14 mmol/L    Urea Nitrogen 20 7 - 25 mg/dL    Creatinine 0.88 0.70 - 1.30 mg/dL    Calcium 9.9 8.6 - 10.3 mg/dL    Glucose 99 70 - 105 mg/dL    Alkaline Phosphatase 48 34 - 104 U/L    AST 21 13 - 39 U/L    ALT 18 7 - 52  U/L    Protein Total 7.3 6.4 - 8.9 g/dL    Albumin 4.7 3.5 - 5.7 g/dL    Bilirubin Total 0.7 0.3 - 1.0 mg/dL    GFR Estimate >90 >60 mL/min/1.73m2   Lipid Profile     Status: Abnormal   Result Value Ref Range    Cholesterol 246 (H) <200 mg/dL    Triglycerides 94 <150 mg/dL    Direct Measure HDL 44 23 - 92 mg/dL    LDL Cholesterol Calculated 183 (H) <=100 mg/dL    Non HDL Cholesterol 202 (H) <130 mg/dL    Patient Fasting > 8hrs? Yes     Narrative    Cholesterol  Desirable:  <200 mg/dL    Triglycerides  Normal:  Less than 150 mg/dL  Borderline High:  150-199 mg/dL  High:  200-499 mg/dL  Very High:  Greater than or equal to 500 mg/dL    Direct Measure HDL  Female:  Greater than or equal to 50 mg/dL   Male:  Greater than or equal to 40 mg/dL    LDL Cholesterol  Desirable:  <100mg/dL  Above Desirable:  100-129 mg/dL   Borderline High:  130-159 mg/dL   High:  160-189 mg/dL   Very High:  >= 190 mg/dL    Non HDL Cholesterol  Desirable:  130 mg/dL  Above Desirable:  130-159 mg/dL  Borderline High:  160-189 mg/dL  High:  190-219 mg/dL  Very High:  Greater than or equal to 220 mg/dL   CBC with platelets     Status: Normal   Result Value Ref Range    WBC Count 5.8 4.0 - 11.0 10e3/uL    RBC Count 5.01 4.40 - 5.90 10e6/uL    Hemoglobin 15.3 13.3 - 17.7 g/dL    Hematocrit 45.0 40.0 - 53.0 %    MCV 90 78 - 100 fL    MCH 30.5 26.5 - 33.0 pg    MCHC 34.0 31.5 - 36.5 g/dL    RDW 12.6 10.0 - 15.0 %    Platelet Count 209 150 - 450 10e3/uL   TSH with free T4 reflex     Status: Normal   Result Value Ref Range    TSH 1.39 0.40 - 4.00 mU/L   EKG 12-lead, tracing only     Status: None   Result Value Ref Range    Systolic Blood Pressure  mmHg    Diastolic Blood Pressure  mmHg    Ventricular Rate 59 BPM    Atrial Rate 59 BPM    IN Interval 178 ms    QRS Duration 98 ms     ms    QTc 394 ms    P Axis 67 degrees    R AXIS 62 degrees    T Axis 53 degrees    Interpretation ECG       Sinus bradycardia  Minimal voltage criteria for LVH, may be  normal variant  Borderline ECG  No previous ECGs available  Confirmed by DO RICO STACY ([id number]) on 4/7/2022 2:50:17 PM     Vitamin D level is normal.  TSH is normal.  PSA is normal.  Chemistry panel is normal.  Cholesterol levels are very high.  CBC is normal.    EKG 4/7/2022 -sinus bradycardia with rate of 59.  Minimal voltage criteria for LVH.  Possible normal variant.  Borderline EKG.    Revised Cardiac Risk Index (RCRI):  The patient has the following serious cardiovascular risks for perioperative complications:   - No serious cardiac risks = 0 points     RCRI Interpretation: 0 points: Class I (very low risk - 0.4% complication rate)           Signed Electronically by: Prieto You MD  Copy of this evaluation report is provided to requesting physician.

## 2022-04-07 NOTE — PATIENT INSTRUCTIONS
"  Before Your Procedure or Hospital Admission  Testing for COVID-19 (Coronavirus)  Thank you for choosing [facility] for your health care needs. The COVID-19 pandemic is a very challenging time for everyone.   Our goal is to keep you and our team here at [facility] safe and healthy. We've taken many steps to make this happen. For example:    We test and screen our staff, care teams and patients for COVID-19.    Everyone at [facility] must wear a mask and stay 6 feet apart.    We are limiting hospital and clinic visitors.  Before you come in  If you test COVID-19 positive with any kind of test before your surgery date, call your surgeon's office. We need to know the date of your positive test. We may need to re-schedule your surgery.  Unless you've had a positive COVID-19 test within the past 90 days, you must get tested for COVID-19 even if you've been vaccinated. Your test needs to happen 2 to 4 days before you check in to the hospital or surgery site.  A clinic scheduler will call you about a week in advance to set up a testing time at one of our labs.  Note: If you have a test anywhere but [facility], be sure you get an RT-PCR or an NAAT (Nucleic Acid Amplification Test) test.  Do NOT get a \"rapid antigen\" test. Negative results from \"rapid antigen\" tests are NOT accepted before your surgery.  After the test, please stay at home and out of contact with other people. This will help prevent possible COVID-19 exposure before your treatment. Please follow all current safety guidelines, including:    Limit trips outside your home.    Limit the number of people you see.    Always wear a mask outside your home.    Use social distancing. Stay 6 feet away from others whenever you can.    Wash your hands often.  If your test shows you have COVID-19  If your test is positive, we'll let you know. A positive test means that you have the virus.   We'll probably have to postpone your admission, " surgery or procedure. Your doctor will discuss this with you. After that, we'll let you know what to do and when you can re-schedule.   We may need to cancel your treatment on short notice for other reasons, too.  If your test shows you DON'T have COVID-19  Even if your test is negative, you can still get COVID-19. It's rare but, sometimes, the test result is wrong. You could also catch the virus after taking the test.   There's a very small chance that you could catch COVID-19 in the hospital or surgery center. [facility] has taken many steps to prevent this from happening.   Day of your surgery or procedure    Please come wearing a face covering that covers both your nose and mouth.    When you arrive, we'll ask you some questions to find out if you've had any exposures to COVID-19, or have any signs of COVID-19.    Ask your care team if you can have visitors. All visitors must wear face coverings and will be screened for exposure to, or signs of, COVID-19.  ? Even if no visitors are allowed, you can still have with you:    Your legal guardian or legal decision maker    Someone to help you, if you are disabled    A parent and one other visitor, if you are younger than 18 years old    A partner and a , if you are in labor  ? We might need to teach you about taking care of yourself after surgery. If so, a visitor can come into the hospital to learn about it, too.  ? The rules for visitors change often, depending on how much the virus is spreading. To learn more, see Visiting a Loved One in the Hospital during the COVID-19 Outbreak.  Please call your care team, hospital or surgery center if you have any questions. We thank you for your understanding and for choosing [facility] for your care.   Possible surgery delay    Like you, we want your surgery to happen when it's scheduled. But sometimes the hospital is so full that it's not safe for you to have your surgery. This is especially true during  "the pandemic. Your surgery may need to be re-scheduled at a later date. If this happens, we will call and tell you.  Questions and answers  Does it matter where I get tested for COVID-19?  Yes. We urge you to get tested at one of our [facility] COVID-19 testing sites. These tests will be either RT-PCR or NAAT, and are accepted. We process these tests in our lab and can get the results quickly. Your [facility] care team needs to get your results before you check in.  What should I do if I can't get tested at [facility]?  You can get tested somewhere else, but you'll need to take these extra steps:   1. Contact your family doctor or clinic to arrange your test.  2. Take the test within 4 days of your surgery or procedure. We can't accept tests older than 4 days.  3. Make sure you're getting an RT-PCR test, or a NAAT. Some places use \"rapid antigen\" tests, but we do NOT accept negative results from those tests before your surgery.  4. Make sure your doctor or clinic faxes your results to [facility] at [phone]. Or take a photo of the results and upload it into [employer].  If we don't get your results in time, we may have to delay or cancel your treatment.  For informational purposes only. Not to replace the advice of your health care provider. Copyright   2020 [facility]. All rights reserved. Clinically reviewed by Infection Prevention and the [facility] COVID-19 Clinical Team. Eved 323870 - Rev 02/01/22.    How to Take Your Medication Before Surgery  - Take all of your medications before surgery as usual  - STOP taking all vitamins and herbal supplements 14 days before surgery.        EKG and labs today.     Consider starting Crestor for cholesterol.   Start Vitamin D - for replacement.     Return in approximately 1 year, or sooner as needed for follow-up with Dr. You.  - Annual Follow-up / Physical    Clinic : [phone]  Appointment line: " [phone]

## 2022-04-07 NOTE — LETTER
April 8, 2022      Roque Almeida  [address] [place] [id number]        Dear ,    We are writing to inform you of your test results.    Cholesterol levels are still very high.  --Recommend starting Crestor as discussed in clinic.    Vitamin D levels look great.  No need to take additional vitamin D supplement.    Continue your multivitamin.    Labs are otherwise stable.   Continue current medications.     Electronically signed by:  Prieto You MD  on April 8, 2022     Resulted Orders   Vitamin D Total   Result Value Ref Range    Vitamin D, Total (25-Hydroxy) 75 30 - 100 ug/L      Comment:      Deficiency:          <10 ug/L  Insufficiency:       10-29 ug/L  Sufficiency:          ug/L  Possible Toxicity:   >100 ug/L     Prostate Specific Antigen   Result Value Ref Range    PSA Tumor Marker 0.95 0.00 - 4.00 ug/L    Narrative    The [employer] Access PSAS WHO assay is a two site immunoenzymatic   assay. Assay values obtained with different assay methods cannot be used   interchangeably due to differences in assay methods and reagent specificity.   Comprehensive metabolic panel   Result Value Ref Range    Sodium 138 134 - 144 mmol/L    Potassium 4.2 3.5 - 5.1 mmol/L    Chloride 102 98 - 107 mmol/L    Carbon Dioxide (CO2) 31 21 - 31 mmol/L    Anion Gap 5 3 - 14 mmol/L    Urea Nitrogen 20 7 - 25 mg/dL    Creatinine 0.88 0.70 - 1.30 mg/dL    Calcium 9.9 8.6 - 10.3 mg/dL    Glucose 99 70 - 105 mg/dL    Alkaline Phosphatase 48 34 - 104 U/L    AST 21 13 - 39 U/L    ALT 18 7 - 52 U/L    Protein Total 7.3 6.4 - 8.9 g/dL    Albumin 4.7 3.5 - 5.7 g/dL    Bilirubin Total 0.7 0.3 - 1.0 mg/dL    GFR Estimate >90 >60 mL/min/1.73m2      Comment:      Effective December 21, 2021 eGFRcr in adults is calculated using the 2021 CKD-EPI creatinine equation which includes age and gender (Karie berg al., NEJM, DOI: 10.1056/HPQMga5811186)   Lipid Profile   Result Value Ref Range    Cholesterol 246 (H) <200 mg/dL     Triglycerides 94 <150 mg/dL    Direct Measure HDL 44 23 - 92 mg/dL    LDL Cholesterol Calculated 183 (H) <=100 mg/dL    Non HDL Cholesterol 202 (H) <130 mg/dL    Patient Fasting > 8hrs? Yes     Narrative    Cholesterol  Desirable:  <200 mg/dL    Triglycerides  Normal:  Less than 150 mg/dL  Borderline High:  150-199 mg/dL  High:  200-499 mg/dL  Very High:  Greater than or equal to 500 mg/dL    Direct Measure HDL  Female:  Greater than or equal to 50 mg/dL   Male:  Greater than or equal to 40 mg/dL    LDL Cholesterol  Desirable:  <100mg/dL  Above Desirable:  100-129 mg/dL   Borderline High:  130-159 mg/dL   High:  160-189 mg/dL   Very High:  >= 190 mg/dL    Non HDL Cholesterol  Desirable:  130 mg/dL  Above Desirable:  130-159 mg/dL  Borderline High:  160-189 mg/dL  High:  190-219 mg/dL  Very High:  Greater than or equal to 220 mg/dL   CBC with platelets   Result Value Ref Range    WBC Count 5.8 4.0 - 11.0 10e3/uL    RBC Count 5.01 4.40 - 5.90 10e6/uL    Hemoglobin 15.3 13.3 - 17.7 g/dL    Hematocrit 45.0 40.0 - 53.0 %    MCV 90 78 - 100 fL    MCH 30.5 26.5 - 33.0 pg    MCHC 34.0 31.5 - 36.5 g/dL    RDW 12.6 10.0 - 15.0 %    Platelet Count 209 150 - 450 10e3/uL   TSH with free T4 reflex   Result Value Ref Range    TSH 1.39 0.40 - 4.00 mU/L       If you have any questions or concerns, please call the clinic at the number listed above.       Sincerely,      Prieto You MD

## 2022-04-09 ENCOUNTER — ALLIED HEALTH/NURSE VISIT (OUTPATIENT)
Dept: FAMILY MEDICINE | Facility: OTHER | Age: 52
End: 2022-04-09
Attending: SURGERY
Payer: COMMERCIAL

## 2022-04-09 DIAGNOSIS — Z12.11 SCREENING FOR COLON CANCER: ICD-10-CM

## 2022-04-09 PROCEDURE — C9803 HOPD COVID-19 SPEC COLLECT: HCPCS

## 2022-04-09 PROCEDURE — U0005 INFEC AGEN DETEC AMPLI PROBE: HCPCS | Mod: ZL

## 2022-04-09 NOTE — PROGRESS NOTES
Patient here today for Covid test. Procedure on 4/12     Wilma Moscoso CNA .............................on 4/9/2022 at 11:46 AM

## 2022-04-10 LAB — SARS-COV-2 RNA RESP QL NAA+PROBE: NEGATIVE

## 2022-04-12 ENCOUNTER — HOSPITAL ENCOUNTER (OUTPATIENT)
Facility: OTHER | Age: 52
Discharge: HOME | End: 2022-04-12
Attending: SURGERY | Admitting: SURGERY
Payer: COMMERCIAL

## 2022-04-12 ENCOUNTER — ANESTHESIA EVENT (OUTPATIENT)
Dept: SURGERY | Facility: OTHER | Age: 52
End: 2022-04-12
Payer: COMMERCIAL

## 2022-04-12 ENCOUNTER — ANESTHESIA (OUTPATIENT)
Dept: SURGERY | Facility: OTHER | Age: 52
End: 2022-04-12
Payer: COMMERCIAL

## 2022-04-12 VITALS
DIASTOLIC BLOOD PRESSURE: 91 MMHG | SYSTOLIC BLOOD PRESSURE: 128 MMHG | TEMPERATURE: 97 F | HEART RATE: 62 BPM | RESPIRATION RATE: 12 BRPM | BODY MASS INDEX: 25.05 KG/M2 | WEIGHT: 175 LBS | OXYGEN SATURATION: 98 % | HEIGHT: 70 IN

## 2022-04-12 PROCEDURE — 45385 COLONOSCOPY W/LESION REMOVAL: CPT | Mod: PT | Performed by: SURGERY

## 2022-04-12 PROCEDURE — 45380 COLONOSCOPY AND BIOPSY: CPT | Performed by: SURGERY

## 2022-04-12 PROCEDURE — 45385 COLONOSCOPY W/LESION REMOVAL: CPT | Performed by: NURSE ANESTHETIST, CERTIFIED REGISTERED

## 2022-04-12 PROCEDURE — 250N000011 HC RX IP 250 OP 636: Performed by: NURSE ANESTHETIST, CERTIFIED REGISTERED

## 2022-04-12 PROCEDURE — 88305 TISSUE EXAM BY PATHOLOGIST: CPT

## 2022-04-12 PROCEDURE — 250N000009 HC RX 250: Performed by: NURSE ANESTHETIST, CERTIFIED REGISTERED

## 2022-04-12 PROCEDURE — 258N000003 HC RX IP 258 OP 636: Performed by: SURGERY

## 2022-04-12 RX ORDER — LIDOCAINE 40 MG/G
CREAM TOPICAL
Status: DISCONTINUED | OUTPATIENT
Start: 2022-04-12 | End: 2022-04-12 | Stop reason: HOSPADM

## 2022-04-12 RX ORDER — SODIUM CHLORIDE, SODIUM LACTATE, POTASSIUM CHLORIDE, CALCIUM CHLORIDE 600; 310; 30; 20 MG/100ML; MG/100ML; MG/100ML; MG/100ML
INJECTION, SOLUTION INTRAVENOUS CONTINUOUS
Status: DISCONTINUED | OUTPATIENT
Start: 2022-04-12 | End: 2022-04-12 | Stop reason: HOSPADM

## 2022-04-12 RX ORDER — PROPOFOL 10 MG/ML
INJECTION, EMULSION INTRAVENOUS CONTINUOUS PRN
Status: DISCONTINUED | OUTPATIENT
Start: 2022-04-12 | End: 2022-04-12

## 2022-04-12 RX ORDER — NALOXONE HYDROCHLORIDE 0.4 MG/ML
0.2 INJECTION, SOLUTION INTRAMUSCULAR; INTRAVENOUS; SUBCUTANEOUS
Status: DISCONTINUED | OUTPATIENT
Start: 2022-04-12 | End: 2022-04-12 | Stop reason: HOSPADM

## 2022-04-12 RX ORDER — NALOXONE HYDROCHLORIDE 0.4 MG/ML
0.4 INJECTION, SOLUTION INTRAMUSCULAR; INTRAVENOUS; SUBCUTANEOUS
Status: DISCONTINUED | OUTPATIENT
Start: 2022-04-12 | End: 2022-04-12 | Stop reason: HOSPADM

## 2022-04-12 RX ORDER — LIDOCAINE HYDROCHLORIDE 20 MG/ML
INJECTION, SOLUTION INFILTRATION; PERINEURAL PRN
Status: DISCONTINUED | OUTPATIENT
Start: 2022-04-12 | End: 2022-04-12

## 2022-04-12 RX ORDER — FLUMAZENIL 0.1 MG/ML
0.2 INJECTION, SOLUTION INTRAVENOUS
Status: DISCONTINUED | OUTPATIENT
Start: 2022-04-12 | End: 2022-04-12 | Stop reason: HOSPADM

## 2022-04-12 RX ORDER — PROPOFOL 10 MG/ML
INJECTION, EMULSION INTRAVENOUS PRN
Status: DISCONTINUED | OUTPATIENT
Start: 2022-04-12 | End: 2022-04-12

## 2022-04-12 RX ADMIN — PROPOFOL 120 MG: 10 INJECTION, EMULSION INTRAVENOUS at 11:17

## 2022-04-12 RX ADMIN — PROPOFOL 150 MCG/KG/MIN: 10 INJECTION, EMULSION INTRAVENOUS at 11:18

## 2022-04-12 RX ADMIN — LIDOCAINE HYDROCHLORIDE 40 MG: 20 INJECTION, SOLUTION INFILTRATION; PERINEURAL at 11:17

## 2022-04-12 RX ADMIN — SODIUM CHLORIDE, POTASSIUM CHLORIDE, SODIUM LACTATE AND CALCIUM CHLORIDE 30 ML/HR: 600; 310; 30; 20 INJECTION, SOLUTION INTRAVENOUS at 10:48

## 2022-04-12 NOTE — ANESTHESIA PREPROCEDURE EVALUATION
Anesthesia Pre-Procedure Evaluation    Patient: Candido Almeida   MRN: [id number] : 1970        Procedure : Procedure(s):  COLONOSCOPY          Past Medical History:   Diagnosis Date     At high risk for tick borne illness       Past Surgical History:   Procedure Laterality Date     VASECTOMY      Vasectomy      No Known Allergies   Social History     Tobacco Use     Smoking status: Never Smoker     Smokeless tobacco: Current User     Types: Chew   Substance Use Topics     Alcohol use: Yes     Alcohol/week: 4.0 standard drinks     Comment: occasional      Wt Readings from Last 1 Encounters:   22 79.4 kg (175 lb)        Anesthesia Evaluation   Pt has had prior anesthetic.     No history of anesthetic complications       ROS/MED HX  ENT/Pulmonary:  - neg pulmonary ROS     Neurologic:  - neg neurologic ROS     Cardiovascular:  - neg cardiovascular ROS     METS/Exercise Tolerance: >4 METS    Hematologic:  - neg hematologic  ROS     Musculoskeletal:  - neg musculoskeletal ROS     GI/Hepatic:  - neg GI/hepatic ROS     Renal/Genitourinary:  - neg Renal ROS     Endo:     (+) thyroid problem, hypothyroidism,     Psychiatric/Substance Use:  - neg psychiatric ROS     Infectious Disease:  - neg infectious disease ROS     Malignancy:  - neg malignancy ROS     Other:  - neg other ROS          Physical Exam    Airway        Mallampati: I   TM distance: > 3 FB   Neck ROM: full   Mouth opening: > 3 cm    Respiratory Devices and Support         Dental  no notable dental history     (+) caps      Cardiovascular   cardiovascular exam normal       Rhythm and rate: regular and normal     Pulmonary   pulmonary exam normal        breath sounds clear to auscultation           OUTSIDE LABS:  CBC:   Lab Results   Component Value Date    WBC 5.8 2022    WBC 5.6 2021    HGB 15.3 2022    HGB 15.4 2021    HCT 45.0 2022    HCT 43.9 2021     2022     2021     BMP:   Lab  Results   Component Value Date     04/07/2022     05/06/2021    POTASSIUM 4.2 04/07/2022    POTASSIUM 3.9 05/06/2021    CHLORIDE 102 04/07/2022    CHLORIDE 101 05/06/2021    CO2 31 04/07/2022    CO2 28 05/06/2021    BUN 20 04/07/2022    BUN 23 05/06/2021    CR 0.88 04/07/2022    CR 0.83 05/06/2021    GLC 99 04/07/2022     (H) 05/06/2021     COAGS: No results found for: PTT, INR, FIBR  POC: No results found for: BGM, HCG, HCGS  HEPATIC:   Lab Results   Component Value Date    ALBUMIN 4.7 04/07/2022    PROTTOTAL 7.3 04/07/2022    ALT 18 04/07/2022    AST 21 04/07/2022    ALKPHOS 48 04/07/2022    BILITOTAL 0.7 04/07/2022     OTHER:   Lab Results   Component Value Date    A1C 5.3 05/21/2021    MARNI 9.9 04/07/2022    TSH 1.39 04/07/2022    CRP 0.775 04/17/2017       Anesthesia Plan    ASA Status:  1   NPO Status:  NPO Appropriate    Anesthesia Type: MAC.     - Reason for MAC: straight local not clinically adequate   Induction: Intravenous.   Maintenance: Balanced.        Consents    Anesthesia Plan(s) and associated risks, benefits, and realistic alternatives discussed. Questions answered and patient/representative(s) expressed understanding.     - Discussed: Risks, Benefits and Alternatives for BOTH SEDATION and the PROCEDURE were discussed     - Discussed with:  Patient      - Extended Intubation/Ventilatory Support Discussed: No.      - Patient is DNR/DNI Status: No    Use of blood products discussed: No .     Postoperative Care            Comments:    Other Comments: Risks, benefits and alternatives discussed and would like to proceed. General anesthesia ok if indicated.             TRACI Strange CRNA

## 2022-04-12 NOTE — OP NOTE
COLONOSCOPY PROCEDURE NOTE    DATE OF SERVICE: 4/12/2022    SURGEON: TRISTAN Collazo MD     PRE-OP DIAGNOSIS:    Healthcare maintenance     POST-OP DIAGNOSIS:    Polyps at splenic flexure     PROCEDURE:   Colonoscopy with snare polypectomy    ASSISTANT:  Circulator: Vero Rogers RN  Scrub Person: Kim Manzanares    ANESTHESIA:  MAC                            Monitor Anesthesia CareCRNA Independent: Abdulaziz Chan APRN CRNA    INDICATION FOR THE PROCEDURE: The patient is a 51 year old male with average risk for colon cancer. The patient has no other complaints.  After explaining the risks to include bleeding, perforation, potential inability to reach the cecum the patient wishes to proceed.    PROCEDURE: After adequate sedation, the patient was in the left lateral decubitus position.  Rectal exam was performed.  There was normal tone and no palpable masses.  The colonoscope was introduced into the rectum and advanced to the cecum with Mild difficulty.  The patient's prep was excellent.  The terminal cecum was reached.  The cecum, ascending, transverse, descending and sigmoid colon were significant for one 2mm polyp at the splenic flexure polyp was removed with cold snare.  The scope was retroflexed in the rectum.  The anorectal junction was unremarkable.  The scope was straightened and removed.  The patient tolerated the procedure well.     ESTIMATED BLOOD LOSS: none    COMPLICATIONS:  None    TISSUE REMOVED:  Yes    RECOMMEND:    Follow-up pending pathology      TRISTAN Collazo MD

## 2022-04-12 NOTE — ANESTHESIA POSTPROCEDURE EVALUATION
Patient: Candido Almeida    Procedure: Procedure(s):  COLONOSCOPY, WITH POLYPECTOMY AND BIOPSY       Anesthesia Type:  MAC    Note:  Disposition: Outpatient   Postop Pain Control: Uneventful            Sign Out: Well controlled pain   PONV: No   Neuro/Psych: Uneventful            Sign Out: Acceptable/Baseline neuro status   Airway/Respiratory: Uneventful            Sign Out: Acceptable/Baseline resp. status   CV/Hemodynamics: Uneventful            Sign Out: Acceptable CV status; No obvious hypovolemia; No obvious fluid overload   Other NRE: NONE   DID A NON-ROUTINE EVENT OCCUR? No           Last vitals:  Vitals Value Taken Time   /91 04/12/22 1215   Temp     Pulse 62 04/12/22 1215   Resp     SpO2 98 % 04/12/22 1216   Vitals shown include unvalidated device data.    Electronically Signed By: TRACI Rodriguez CRNA  April 12, 2022  1:02 PM

## 2022-04-12 NOTE — ANESTHESIA CARE TRANSFER NOTE
Patient: Candido Almeida    Procedure: Procedure(s):  COLONOSCOPY, WITH POLYPECTOMY AND BIOPSY       Diagnosis: Encounter for screening colonoscopy [Z12.11]  Diagnosis Additional Information: No value filed.    Anesthesia Type:   MAC     Note:    Oropharynx: oropharynx clear of all foreign objects and spontaneously breathing  Level of Consciousness: drowsy  Oxygen Supplementation: nasal cannula  Level of Supplemental Oxygen (L/min / FiO2): 2  Independent Airway: airway patency satisfactory and stable  Dentition: dentition unchanged  Vital Signs Stable: post-procedure vital signs reviewed and stable  Report to RN Given: handoff report given  Patient transferred to: Phase II    Handoff Report: Identifed the Patient, Identified the Reponsible Provider, Reviewed the pertinent medical history, Discussed the surgical course, Reviewed Intra-OP anesthesia mangement and issues during anesthesia, Set expectations for post-procedure period and Allowed opportunity for questions and acknowledgement of understanding      Vitals:  Vitals Value Taken Time   /72 04/12/22 1145   Temp     Pulse 63 04/12/22 1145   Resp     SpO2 100 % 04/12/22 1148   Vitals shown include unvalidated device data.    Electronically Signed By: TRACI Rodriguez CRNA  April 12, 2022  11:49 AM

## 2022-04-12 NOTE — OR NURSING
patient has been discharged to home at 1245 via ambulation accompanied by wife    Written discharge instructions were provided to patient.  Prescriptions were none.      Patient and adult caring for them verbalize understanding of discharge instructions including no driving until tomorrow and no longer taking narcotic pain medications - no operating mechanical equipment and no making any important decisions.They understand reason for discharge, and necessary follow-up appointments.

## 2022-04-12 NOTE — H&P
GENERAL SURGERY CONSULTATION NOTE    Candido Almeida   [address] [address]  51 year old  male    Primary Care Provider:  Prieto You      HPI: Candido Almeida presents to day surgery in need of colonoscopy for screening for colon cancer.   Candido Almeida denies family history of colon cancer. Patient denies change in bowel habits or blood in stools. Previous colonoscopy was never.     REVIEW OF SYSTEMS:    GENERAL: No fevers or chills. Denies fatigue, recent weight loss.  HEENT: No sinus drainage. No changes with vision or hearing. No difficulty swallowing.   LYMPHATICS:  Noswollen nodes in axilla, neck or groin.  CARDIOVASCULAR: Denies chest pain, palpitations and dyspnea on exertion.  PULMONARY: No shortness of breath or cough. No increase in sputum production.  GI: Denies melena,bright red blood in stools. No hematemesis. No constipation or diarrhea.  : No dysuria or hematuria.  SKIN: No recent rashes or ulcers.   HEMATOLOGY:  No history of easy bruising or bleeding.  ENDOCRINE:  No history of diabetes or thyroid problems.  NEUROLOGY:  No history of seizures or headaches. No motor or sensory changes.        Patient Active Problem List   Diagnosis     Family history of heart disease in male family member before age 55     Hamstring tightness of left lower extremity     Health care maintenance     Health examination of defined subpopulation     Hypothyroidism due to acquired atrophy of thyroid     Familial hyperlipidemia     Nummular dermatitis     Rash     Right medial knee pain     Dupuytren's contracture of both hands     Contracture of finger joint, left       Past Medical History:   Diagnosis Date     At high risk for tick borne illness        Past Surgical History:   Procedure Laterality Date     VASECTOMY      Vasectomy       Family History   Problem Relation Age of Onset     Genetic Disorder Other         Genetic,Hypercholesterolemia, No FHx of DM, CVA, Cancers     Colon Cancer  "Other         Cancer-colon,No FHx       Social History     Social History Narrative    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   Son and daughter.   Wife Lyn.       Social History     Socioeconomic History     Marital status:      Spouse name: Lyn     Number of children: Not on file     Years of education: Not on file     Highest education level: Not on file   Occupational History     Not on file   Tobacco Use     Smoking status: Never Smoker     Smokeless tobacco: Current User     Types: Chew   Substance and Sexual Activity     Alcohol use: Yes     Alcohol/week: 4.0 standard drinks     Comment: occasional     Drug use: No     Sexual activity: Yes     Partners: Female     Birth control/protection: None     Comment: doctor to address    Other Topics Concern     Parent/sibling w/ CABG, MI or angioplasty before 65F 55M? 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   Son and daughter.   Wife Lyn.     Social Determinants of Health     Financial Resource Strain: Not on file   Food Insecurity: Not on file   Transportation Needs: Not on file   Physical Activity: Not on file   Stress: Not on file   Social Connections: Not on file   Intimate Partner Violence: Not on file   Housing Stability: Not on file       No current facility-administered medications on file prior to encounter.  doxycycline hyclate (VIBRA-TABS) 100 MG tablet, Take 2 tablets with food following tick bite  levothyroxine (SYNTHROID/LEVOTHROID) 112 MCG tablet, Take 1 tablet (112 mcg) by mouth daily          ALLERGIES/SENSITIVITIES: No Known Allergies    PHYSICAL EXAM:     BP (!) 148/82 (Cuff Size: Adult Regular)   Pulse 61   Temp 97  F (36.1  C) (Tympanic)   Resp 12   Ht 1.778 m (5' 10\")   Wt 79.4 kg (175 lb)   SpO2 99%   BMI 25.11 kg/m      General Appearance:   Sitting up in bed, no apparent distress  HEENT: Pupils are equal and reactive, no scleral icterus   Heart & CV:  RRR, no murmur.  LUNGS: No increased work of " breathing. Lungs are CTA B/L, no wheezing or crackles.  Abd:  soft, non-tender, no masses   Ext: no lower extremity edema   Neuro: alert and oriented, normal speech and mentation         CONSULTATION ASSESSMENT AND PLAN:    51 year old male with average risk for colon cancer in need of screening exam.      The technical details of colonoscopy were discussed with the patient along with the risks and benefits to include bleeding, perforation and incomplete study. Candido Almeida demonstrated understanding and is willing to proceed.       Lux Collazo MD on 4/12/2022 at 11:06 AM

## 2022-04-12 NOTE — H&P (VIEW-ONLY)
GENERAL SURGERY CONSULTATION NOTE    Candido Almeida   [address] [address]  51 year old  male    Primary Care Provider:  Prieto You      HPI: Candido Almeida presents to day surgery in need of colonoscopy for screening for colon cancer.   Candido Almeida denies family history of colon cancer. Patient denies change in bowel habits or blood in stools. Previous colonoscopy was never.     REVIEW OF SYSTEMS:    GENERAL: No fevers or chills. Denies fatigue, recent weight loss.  HEENT: No sinus drainage. No changes with vision or hearing. No difficulty swallowing.   LYMPHATICS:  Noswollen nodes in axilla, neck or groin.  CARDIOVASCULAR: Denies chest pain, palpitations and dyspnea on exertion.  PULMONARY: No shortness of breath or cough. No increase in sputum production.  GI: Denies melena,bright red blood in stools. No hematemesis. No constipation or diarrhea.  : No dysuria or hematuria.  SKIN: No recent rashes or ulcers.   HEMATOLOGY:  No history of easy bruising or bleeding.  ENDOCRINE:  No history of diabetes or thyroid problems.  NEUROLOGY:  No history of seizures or headaches. No motor or sensory changes.        Patient Active Problem List   Diagnosis     Family history of heart disease in male family member before age 55     Hamstring tightness of left lower extremity     Health care maintenance     Health examination of defined subpopulation     Hypothyroidism due to acquired atrophy of thyroid     Familial hyperlipidemia     Nummular dermatitis     Rash     Right medial knee pain     Dupuytren's contracture of both hands     Contracture of finger joint, left       Past Medical History:   Diagnosis Date     At high risk for tick borne illness        Past Surgical History:   Procedure Laterality Date     VASECTOMY      Vasectomy       Family History   Problem Relation Age of Onset     Genetic Disorder Other         Genetic,Hypercholesterolemia, No FHx of DM, CVA, Cancers     Colon Cancer  "Other         Cancer-colon,No FHx       Social History     Social History Narrative    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   Son and daughter.   Wife Lyn.       Social History     Socioeconomic History     Marital status:      Spouse name: Lyn     Number of children: Not on file     Years of education: Not on file     Highest education level: Not on file   Occupational History     Not on file   Tobacco Use     Smoking status: Never Smoker     Smokeless tobacco: Current User     Types: Chew   Substance and Sexual Activity     Alcohol use: Yes     Alcohol/week: 4.0 standard drinks     Comment: occasional     Drug use: No     Sexual activity: Yes     Partners: Female     Birth control/protection: None     Comment: doctor to address    Other Topics Concern     Parent/sibling w/ CABG, MI or angioplasty before 65F 55M? 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   Son and daughter.   Wife Lyn.     Social Determinants of Health     Financial Resource Strain: Not on file   Food Insecurity: Not on file   Transportation Needs: Not on file   Physical Activity: Not on file   Stress: Not on file   Social Connections: Not on file   Intimate Partner Violence: Not on file   Housing Stability: Not on file       No current facility-administered medications on file prior to encounter.  doxycycline hyclate (VIBRA-TABS) 100 MG tablet, Take 2 tablets with food following tick bite  levothyroxine (SYNTHROID/LEVOTHROID) 112 MCG tablet, Take 1 tablet (112 mcg) by mouth daily          ALLERGIES/SENSITIVITIES: No Known Allergies    PHYSICAL EXAM:     BP (!) 148/82 (Cuff Size: Adult Regular)   Pulse 61   Temp 97  F (36.1  C) (Tympanic)   Resp 12   Ht 1.778 m (5' 10\")   Wt 79.4 kg (175 lb)   SpO2 99%   BMI 25.11 kg/m      General Appearance:   Sitting up in bed, no apparent distress  HEENT: Pupils are equal and reactive, no scleral icterus   Heart & CV:  RRR, no murmur.  LUNGS: No increased work of " breathing. Lungs are CTA B/L, no wheezing or crackles.  Abd:  soft, non-tender, no masses   Ext: no lower extremity edema   Neuro: alert and oriented, normal speech and mentation         CONSULTATION ASSESSMENT AND PLAN:    51 year old male with average risk for colon cancer in need of screening exam.      The technical details of colonoscopy were discussed with the patient along with the risks and benefits to include bleeding, perforation and incomplete study. Candido Almeida demonstrated understanding and is willing to proceed.       Lux Collazo MD on 4/12/2022 at 11:06 AM

## 2022-04-12 NOTE — DISCHARGE INSTRUCTIONS
[facility]  Adult Discharge Orders & Instructions    ________________________________________________________________          For 12 hours after surgery  Get plenty of rest.  A responsible adult must stay with you for at least 12 hours after you leave the hospital.   You may feel lightheaded.  IF so, sit for a few minutes before standing.  Have someone help you get up.   You may have a slight fever. Call the doctor if your fever is over 101 F (38.3 C) (taken under the tongue) or lasts longer than 24 hours.  You may have a dry mouth, a sore throat, muscle aches or trouble sleeping.  These should go away after 24 hours.  Do not make important or legal decisions.  6.   Do not drive or use heavy equipment.  If you have weakness or tingling, don't drive or use heavy equipment until this feeling goes away.    To contact a doctor, call   [phone]_______________________

## 2022-04-13 LAB
PATH REPORT.COMMENTS IMP SPEC: NORMAL
PATH REPORT.FINAL DX SPEC: NORMAL
PATH REPORT.RELEVANT HX SPEC: NORMAL
PHOTO IMAGE: NORMAL

## 2022-04-18 ENCOUNTER — ALLIED HEALTH/NURSE VISIT (OUTPATIENT)
Dept: FAMILY MEDICINE | Facility: OTHER | Age: 52
End: 2022-04-18
Attending: INTERNAL MEDICINE
Payer: COMMERCIAL

## 2022-04-18 DIAGNOSIS — Z20.822 COVID-19 RULED OUT: Primary | ICD-10-CM

## 2022-04-18 PROCEDURE — C9803 HOPD COVID-19 SPEC COLLECT: HCPCS

## 2022-04-18 PROCEDURE — U0003 INFECTIOUS AGENT DETECTION BY NUCLEIC ACID (DNA OR RNA); SEVERE ACUTE RESPIRATORY SYNDROME CORONAVIRUS 2 (SARS-COV-2) (CORONAVIRUS DISEASE [COVID-19]), AMPLIFIED PROBE TECHNIQUE, MAKING USE OF HIGH THROUGHPUT TECHNOLOGIES AS DESCRIBED BY CMS-2020-01-R: HCPCS | Mod: ZL

## 2022-04-18 NOTE — PROGRESS NOTES
Patient here today for Covid test. Procedure on 4/22/22.     Wilma Moscoso CNA .............................on 4/18/2022 at 8:03 AM

## 2022-04-19 LAB — SARS-COV-2 RNA RESP QL NAA+PROBE: NEGATIVE

## 2022-04-21 ENCOUNTER — ANESTHESIA EVENT (OUTPATIENT)
Dept: SURGERY | Facility: OTHER | Age: 52
End: 2022-04-21
Payer: COMMERCIAL

## 2022-04-22 ENCOUNTER — ANESTHESIA (OUTPATIENT)
Dept: SURGERY | Facility: OTHER | Age: 52
End: 2022-04-22
Payer: COMMERCIAL

## 2022-04-22 ENCOUNTER — HOSPITAL ENCOUNTER (OUTPATIENT)
Facility: OTHER | Age: 52
Discharge: HOME | End: 2022-04-22
Attending: SPECIALIST | Admitting: SPECIALIST
Payer: COMMERCIAL

## 2022-04-22 VITALS
WEIGHT: 175 LBS | BODY MASS INDEX: 25.05 KG/M2 | HEIGHT: 70 IN | DIASTOLIC BLOOD PRESSURE: 83 MMHG | SYSTOLIC BLOOD PRESSURE: 122 MMHG | HEART RATE: 59 BPM | TEMPERATURE: 97.4 F | OXYGEN SATURATION: 96 % | RESPIRATION RATE: 19 BRPM

## 2022-04-22 DIAGNOSIS — M72.0 DUPUYTREN'S CONTRACTURE OF BOTH HANDS: Primary | ICD-10-CM

## 2022-04-22 DIAGNOSIS — M72.0 DUPUYTREN'S CONTRACTURE OF LEFT HAND: Primary | ICD-10-CM

## 2022-04-22 LAB — GLUCOSE BLDC GLUCOMTR-MCNC: 91 MG/DL (ref 70–99)

## 2022-04-22 PROCEDURE — 82962 GLUCOSE BLOOD TEST: CPT

## 2022-04-22 PROCEDURE — 250N000009 HC RX 250: Performed by: NURSE ANESTHETIST, CERTIFIED REGISTERED

## 2022-04-22 PROCEDURE — 999N000141 HC STATISTIC PRE-PROCEDURE NURSING ASSESSMENT: Performed by: SPECIALIST

## 2022-04-22 PROCEDURE — 360N000075 HC SURGERY LEVEL 2, PER MIN: Performed by: SPECIALIST

## 2022-04-22 PROCEDURE — 26123 RELEASE PALM CONTRACTURE: CPT | Performed by: NURSE ANESTHETIST, CERTIFIED REGISTERED

## 2022-04-22 PROCEDURE — 710N000012 HC RECOVERY PHASE 2, PER MINUTE: Performed by: SPECIALIST

## 2022-04-22 PROCEDURE — 250N000026 HC DESFLURANE, PER MIN: Performed by: SPECIALIST

## 2022-04-22 PROCEDURE — 258N000003 HC RX IP 258 OP 636: Performed by: NURSE ANESTHETIST, CERTIFIED REGISTERED

## 2022-04-22 PROCEDURE — 250N000011 HC RX IP 250 OP 636: Performed by: NURSE ANESTHETIST, CERTIFIED REGISTERED

## 2022-04-22 PROCEDURE — 370N000017 HC ANESTHESIA TECHNICAL FEE, PER MIN: Performed by: SPECIALIST

## 2022-04-22 PROCEDURE — 710N000010 HC RECOVERY PHASE 1, LEVEL 2, PER MIN: Performed by: SPECIALIST

## 2022-04-22 PROCEDURE — 250N000009 HC RX 250: Performed by: SPECIALIST

## 2022-04-22 PROCEDURE — 272N000001 HC OR GENERAL SUPPLY STERILE: Performed by: SPECIALIST

## 2022-04-22 PROCEDURE — 26123 RELEASE PALM CONTRACTURE: CPT | Mod: F4 | Performed by: SPECIALIST

## 2022-04-22 PROCEDURE — 250N000011 HC RX IP 250 OP 636: Performed by: SPECIALIST

## 2022-04-22 RX ORDER — ONDANSETRON 2 MG/ML
4 INJECTION INTRAMUSCULAR; INTRAVENOUS EVERY 30 MIN PRN
Status: DISCONTINUED | OUTPATIENT
Start: 2022-04-22 | End: 2022-04-22 | Stop reason: HOSPADM

## 2022-04-22 RX ORDER — HYDROCODONE BITARTRATE AND ACETAMINOPHEN 5; 325 MG/1; MG/1
1-2 TABLET ORAL EVERY 4 HOURS PRN
Qty: 15 TABLET | Refills: 0 | Status: SHIPPED | OUTPATIENT
Start: 2022-04-22

## 2022-04-22 RX ORDER — NALOXONE HYDROCHLORIDE 0.4 MG/ML
0.4 INJECTION, SOLUTION INTRAMUSCULAR; INTRAVENOUS; SUBCUTANEOUS
Status: DISCONTINUED | OUTPATIENT
Start: 2022-04-22 | End: 2022-04-22 | Stop reason: HOSPADM

## 2022-04-22 RX ORDER — LIDOCAINE HYDROCHLORIDE 20 MG/ML
INJECTION, SOLUTION INFILTRATION; PERINEURAL PRN
Status: DISCONTINUED | OUTPATIENT
Start: 2022-04-22 | End: 2022-04-22

## 2022-04-22 RX ORDER — PROPOFOL 10 MG/ML
INJECTION, EMULSION INTRAVENOUS PRN
Status: DISCONTINUED | OUTPATIENT
Start: 2022-04-22 | End: 2022-04-22

## 2022-04-22 RX ORDER — SODIUM CHLORIDE, SODIUM LACTATE, POTASSIUM CHLORIDE, CALCIUM CHLORIDE 600; 310; 30; 20 MG/100ML; MG/100ML; MG/100ML; MG/100ML
INJECTION, SOLUTION INTRAVENOUS CONTINUOUS
Status: DISCONTINUED | OUTPATIENT
Start: 2022-04-22 | End: 2022-04-22 | Stop reason: HOSPADM

## 2022-04-22 RX ORDER — FENTANYL CITRATE 50 UG/ML
25 INJECTION, SOLUTION INTRAMUSCULAR; INTRAVENOUS EVERY 5 MIN PRN
Status: DISCONTINUED | OUTPATIENT
Start: 2022-04-22 | End: 2022-04-22 | Stop reason: HOSPADM

## 2022-04-22 RX ORDER — FENTANYL CITRATE 50 UG/ML
INJECTION, SOLUTION INTRAMUSCULAR; INTRAVENOUS PRN
Status: DISCONTINUED | OUTPATIENT
Start: 2022-04-22 | End: 2022-04-22

## 2022-04-22 RX ORDER — MAGNESIUM HYDROXIDE 1200 MG/15ML
LIQUID ORAL PRN
Status: DISCONTINUED | OUTPATIENT
Start: 2022-04-22 | End: 2022-04-22 | Stop reason: HOSPADM

## 2022-04-22 RX ORDER — PROPOFOL 10 MG/ML
INJECTION, EMULSION INTRAVENOUS CONTINUOUS PRN
Status: DISCONTINUED | OUTPATIENT
Start: 2022-04-22 | End: 2022-04-22

## 2022-04-22 RX ORDER — KETOROLAC TROMETHAMINE 30 MG/ML
INJECTION, SOLUTION INTRAMUSCULAR; INTRAVENOUS PRN
Status: DISCONTINUED | OUTPATIENT
Start: 2022-04-22 | End: 2022-04-22

## 2022-04-22 RX ORDER — LIDOCAINE 40 MG/G
CREAM TOPICAL
Status: DISCONTINUED | OUTPATIENT
Start: 2022-04-22 | End: 2022-04-22 | Stop reason: HOSPADM

## 2022-04-22 RX ORDER — ONDANSETRON 2 MG/ML
INJECTION INTRAMUSCULAR; INTRAVENOUS PRN
Status: DISCONTINUED | OUTPATIENT
Start: 2022-04-22 | End: 2022-04-22

## 2022-04-22 RX ORDER — BUPIVACAINE HYDROCHLORIDE 2.5 MG/ML
INJECTION, SOLUTION EPIDURAL; INFILTRATION; INTRACAUDAL PRN
Status: DISCONTINUED | OUTPATIENT
Start: 2022-04-22 | End: 2022-04-22 | Stop reason: HOSPADM

## 2022-04-22 RX ORDER — NALOXONE HYDROCHLORIDE 0.4 MG/ML
0.2 INJECTION, SOLUTION INTRAMUSCULAR; INTRAVENOUS; SUBCUTANEOUS
Status: DISCONTINUED | OUTPATIENT
Start: 2022-04-22 | End: 2022-04-22 | Stop reason: HOSPADM

## 2022-04-22 RX ORDER — ONDANSETRON 4 MG/1
4 TABLET, ORALLY DISINTEGRATING ORAL EVERY 30 MIN PRN
Status: DISCONTINUED | OUTPATIENT
Start: 2022-04-22 | End: 2022-04-22 | Stop reason: HOSPADM

## 2022-04-22 RX ORDER — DEXAMETHASONE SODIUM PHOSPHATE 4 MG/ML
INJECTION, SOLUTION INTRA-ARTICULAR; INTRALESIONAL; INTRAMUSCULAR; INTRAVENOUS; SOFT TISSUE PRN
Status: DISCONTINUED | OUTPATIENT
Start: 2022-04-22 | End: 2022-04-22

## 2022-04-22 RX ADMIN — MIDAZOLAM HYDROCHLORIDE 2 MG: 1 INJECTION, SOLUTION INTRAMUSCULAR; INTRAVENOUS at 08:00

## 2022-04-22 RX ADMIN — PROPOFOL 200 MG: 10 INJECTION, EMULSION INTRAVENOUS at 08:03

## 2022-04-22 RX ADMIN — FENTANYL CITRATE 50 MCG: 50 INJECTION, SOLUTION INTRAMUSCULAR; INTRAVENOUS at 08:27

## 2022-04-22 RX ADMIN — LIDOCAINE HYDROCHLORIDE 40 MG: 20 INJECTION, SOLUTION INFILTRATION; PERINEURAL at 08:03

## 2022-04-22 RX ADMIN — DEXAMETHASONE SODIUM PHOSPHATE 4 MG: 4 INJECTION, SOLUTION INTRAMUSCULAR; INTRAVENOUS at 08:06

## 2022-04-22 RX ADMIN — PROPOFOL 75 MCG/KG/MIN: 10 INJECTION, EMULSION INTRAVENOUS at 08:03

## 2022-04-22 RX ADMIN — FENTANYL CITRATE 25 MCG: 50 INJECTION, SOLUTION INTRAMUSCULAR; INTRAVENOUS at 08:47

## 2022-04-22 RX ADMIN — FENTANYL CITRATE 25 MCG: 50 INJECTION, SOLUTION INTRAMUSCULAR; INTRAVENOUS at 09:02

## 2022-04-22 RX ADMIN — ONDANSETRON HYDROCHLORIDE 4 MG: 2 SOLUTION INTRAMUSCULAR; INTRAVENOUS at 08:03

## 2022-04-22 RX ADMIN — SODIUM CHLORIDE, POTASSIUM CHLORIDE, SODIUM LACTATE AND CALCIUM CHLORIDE 100 ML/HR: 600; 310; 30; 20 INJECTION, SOLUTION INTRAVENOUS at 07:11

## 2022-04-22 RX ADMIN — KETOROLAC TROMETHAMINE 30 MG: 30 INJECTION, SOLUTION INTRAMUSCULAR at 09:11

## 2022-04-22 NOTE — OP NOTE
Procedure Date: 04/22/2022    PREOPERATIVE DIAGNOSIS:  Dupuytren's contracture, left small finger.    POSTOPERATIVE DIAGNOSIS:  Dupuytren's contracture, left small finger.    PROCEDURE PERFORMED:  Digital and palmar fasciectomy, left small finger.    SURGEON:  Shawn Mckeon MD    ASSISTANT:  Jamie Coronado PA-C    ANESTHESIA:  General.    INDICATIONS FOR PROCEDURE:  This is a 51-year-old male who presented with a Dupuytren's contracture, which met surgical criteria.  He had failed nonoperative treatment and was offered digital and palmar fasciectomies.  Risks, complications, and benefits reviewed, and the patient elected to proceed.    DESCRIPTION OF PROCEDURE:  Following medical clearance, the patient was brought to the operating room and placed on the operating table.  General anesthesia was induced.  Pneumatic tourniquet was placed about the left upper extremity.  Left upper extremity was prepped and draped in sterile manner.  Timeout procedure performed confirming surgical site, patient and procedure.  Left upper extremity was then elevated, exsanguinated, and the tourniquet was inflated to 250 mmHg.  Modified Lina incisions were extended from the pretendinous cord to the mid palmar crease.  Dissection was started proximally and carried distally.  There was significant scarring and the palmar fascia was quite thickened and broader than typical.  This pretendinous cord was excised without difficulty.  We then extended the dissection from there to the middle phalanx to beyond the PIP joint.  Here again dissection was started proximally and carried distally.  Neurovascular structures were identified and protected.  The PIP joint required a PIP joint capsulotomy, and full extension was obtained.  Once this was completed, we irrigated.  Tourniquet was deflated.  Circulation returned promptly to the hand and fingers.  Hemostasis with direct pressure.  Skin was closed with nylon suture.  The hand was splinted  and the patient was brought to recovery room in stable condition.    Shawn Mckeon MD        D: 2022   T: 2022   MT: [id number]    Name:     RAMANA RAPP  MRN:      [id number]        Account:        [id number]   :      1970           Procedure Date: 2022     Document: [id number]

## 2022-04-22 NOTE — DISCHARGE INSTRUCTIONS
[facility]  Adult Discharge Orders & Instructions      For 24 hours after surgery:  Get plenty of rest.  A responsible adult must stay with you for at least 24 hours after you leave the hospital.   You may feel lightheaded.  IF so, sit for a few minutes before standing.  Have someone help you get up.   You may have a slight fever. Call the doctor if your fever is over 101 F (38.3 C) (taken under the tongue) or lasts longer than 24 hours.  You may have a dry mouth, a sore throat, muscle aches or trouble sleeping.  These should go away after 24 hours.  Do not make important or legal decisions.  6.   Do not drive or use heavy equipment.  If you have weakness or tingling, don't drive or use heavy equipment until this feeling goes away.                                                                                                                                                                         To contact a doctor, call    [phone]______________

## 2022-04-22 NOTE — INTERVAL H&P NOTE
"The History and Physical has been reviewed, the patient has been examined and no changes have occurred in the patient's condition since the H & P was completed.         Clinical Conditions Present on Arrival:  Clinically Significant Risk Factors Present on Admission                   # Overweight: Estimated body mass index is 25.11 kg/m  as calculated from the following:    Height as of this encounter: 1.778 m (5' 10\").    Weight as of this encounter: 79.4 kg (175 lb).       "

## 2022-04-22 NOTE — ANESTHESIA POSTPROCEDURE EVALUATION
Patient: Candido Almeida    Procedure: Procedure(s):  Digital and Florez Fasciectomies 5th finger       Anesthesia Type:  General    Note:  Disposition: Outpatient   Postop Pain Control: Uneventful            Sign Out: Well controlled pain   PONV: No   Neuro/Psych: Uneventful            Sign Out: Acceptable/Baseline neuro status   Airway/Respiratory: Uneventful            Sign Out: Acceptable/Baseline resp. status   CV/Hemodynamics: Uneventful            Sign Out: Acceptable CV status; No obvious hypovolemia; No obvious fluid overload   Other NRE: NONE   DID A NON-ROUTINE EVENT OCCUR? No           Last vitals:  Vitals Value Taken Time   /84 04/22/22 0945   Temp 97  F (36.1  C) 04/22/22 0940   Pulse 76 04/22/22 0946   Resp 15 04/22/22 0946   SpO2 97 % 04/22/22 0946   Vitals shown include unvalidated device data.    Electronically Signed By: TRACI Rodriguez CRNA  April 22, 2022  12:09 PM

## 2022-04-22 NOTE — OR NURSING
PACU Respiratory Event Documentation     1) Episodes of Apnea greater than or equal to 10 seconds: no    2) Bradypnea - less than 8 breaths per minute: no    3) Pain score on 0 to 10 scale: 2    4) Pain-sedation mismatch (yes or no): no    5) Repeated 02 desaturation less than 90% (yes or no): no    Anesthesia notified? (yes or no): no    Any of the above events occuring repeatedly in separate 30 minute intervals may be considered recurrent PACU respiratory events.

## 2022-04-22 NOTE — BRIEF OP NOTE
[facility]    Brief Operative Note    Pre-operative diagnosis: Dupuytren's contracture [M72.0]  Post-operative diagnosis Same as pre-operative diagnosis    Procedure: Procedure(s):  Digital and Florez Fasciectomies 5th finger  Surgeon: Surgeon(s) and Role:     * Shawn Mckeon MD - Primary     * Jamie Coronado PA - Assisting  Anesthesia: General   Estimated Blood Loss: None    Drains: None  Specimens: * No specimens in log *  Findings:   None.  Complications: None.  Implants: * No implants in log *

## 2022-04-22 NOTE — ANESTHESIA PROCEDURE NOTES
Airway       Patient location during procedure: OR       Procedure Start/Stop Times: 4/22/2022 8:04 AM  Staff -        CRNA: Kandice Simon APRN CRNA       Performed By: CRNA  Consent for Airway        Urgency: elective  Indications and Patient Condition       Indications for airway management: amandeep-procedural       Induction type:intravenous       Mask difficulty assessment: 0 - not attempted    Final Airway Details       Final airway type: supraglottic airway    Supraglottic Airway Details        Type: LMA       Brand: I-Gel       LMA size: 4    Post intubation assessment        Placement verified by: capnometry, equal breath sounds and chest rise        Number of attempts at approach: 1       Secured with: silk tape       Ease of procedure: easy       Dentition: Intact and Unchanged    Medication(s) Administered   Medication Administration Time: 4/22/2022 8:04 AM

## 2022-04-22 NOTE — OR NURSING
Patient was discharged home via ambulatory.   Discharge instructions were reviewed with patient and wife. And they verbalized understanding.   Prescriptions: escribed to pharmacy.    PT order faxed to [facility].  Patient in sling, dsg D/I, ice given.   Pain 1/10

## 2022-04-22 NOTE — ANESTHESIA CARE TRANSFER NOTE
Patient: Candido Almeida    Procedure: Procedure(s):  Digital and Florez Fasciectomies 5th finger       Diagnosis: Dupuytren's contracture [M72.0]  Diagnosis Additional Information: No value filed.    Anesthesia Type:   General     Note:    Oropharynx: oral airway in place  Level of Consciousness: unresponsive  Oxygen Supplementation: blow-by O2  Level of Supplemental Oxygen (L/min / FiO2): 8  Independent Airway: airway patency not satisfactory and stable  Dentition: dentition unchanged  Vital Signs Stable: post-procedure vital signs reviewed and stable  Report to RN Given: handoff report given  Patient transferred to: PACU    Handoff Report: Identifed the Patient, Identified the Reponsible Provider, Reviewed the pertinent medical history, Discussed the surgical course, Reviewed Intra-OP anesthesia mangement and issues during anesthesia, Set expectations for post-procedure period and Allowed opportunity for questions and acknowledgement of understanding      Vitals:  Vitals Value Taken Time   BP     Temp     Pulse     Resp     SpO2         Electronically Signed By: TRACI VALE CRNA  April 22, 2022  9:25 AM

## 2022-04-22 NOTE — ANESTHESIA PREPROCEDURE EVALUATION
Anesthesia Pre-Procedure Evaluation    Patient: Candido Almeida   MRN: [id number] : 1970        Procedure : Procedure(s):  Digital and Florez Fasciectomies          Past Medical History:   Diagnosis Date     At high risk for tick borne illness       Past Surgical History:   Procedure Laterality Date     COLONOSCOPY N/A 2022    normal colonoscopy, follow up 10 years, 2032     VASECTOMY      Vasectomy      No Known Allergies   Social History     Tobacco Use     Smoking status: Never Smoker     Smokeless tobacco: Current User     Types: Chew   Substance Use Topics     Alcohol use: Yes     Alcohol/week: 4.0 standard drinks     Comment: occasional      Wt Readings from Last 1 Encounters:   22 79.4 kg (175 lb)        Anesthesia Evaluation   Pt has had prior anesthetic.     No history of anesthetic complications       ROS/MED HX  ENT/Pulmonary:  - neg pulmonary ROS     Neurologic:  - neg neurologic ROS     Cardiovascular:  - neg cardiovascular ROS     METS/Exercise Tolerance: >4 METS    Hematologic:  - neg hematologic  ROS     Musculoskeletal:  - neg musculoskeletal ROS     GI/Hepatic:  - neg GI/hepatic ROS     Renal/Genitourinary:  - neg Renal ROS     Endo:     (+) thyroid problem, hypothyroidism,     Psychiatric/Substance Use:  - neg psychiatric ROS     Infectious Disease:  - neg infectious disease ROS     Malignancy:  - neg malignancy ROS     Other:  - neg other ROS          Physical Exam    Airway        Mallampati: II   TM distance: > 3 FB   Neck ROM: full   Mouth opening: > 3 cm    Respiratory Devices and Support         Dental  no notable dental history         Cardiovascular   cardiovascular exam normal       Rhythm and rate: regular and normal     Pulmonary   pulmonary exam normal        breath sounds clear to auscultation           OUTSIDE LABS:  CBC:   Lab Results   Component Value Date    WBC 5.8 2022    WBC 5.6 2021    HGB 15.3 2022    HGB 15.4 2021    HCT 45.0  04/07/2022    HCT 43.9 05/06/2021     04/07/2022     05/06/2021     BMP:   Lab Results   Component Value Date     04/07/2022     05/06/2021    POTASSIUM 4.2 04/07/2022    POTASSIUM 3.9 05/06/2021    CHLORIDE 102 04/07/2022    CHLORIDE 101 05/06/2021    CO2 31 04/07/2022    CO2 28 05/06/2021    BUN 20 04/07/2022    BUN 23 05/06/2021    CR 0.88 04/07/2022    CR 0.83 05/06/2021    GLC 91 04/22/2022    GLC 99 04/07/2022     COAGS: No results found for: PTT, INR, FIBR  POC: No results found for: BGM, HCG, HCGS  HEPATIC:   Lab Results   Component Value Date    ALBUMIN 4.7 04/07/2022    PROTTOTAL 7.3 04/07/2022    ALT 18 04/07/2022    AST 21 04/07/2022    ALKPHOS 48 04/07/2022    BILITOTAL 0.7 04/07/2022     OTHER:   Lab Results   Component Value Date    A1C 5.3 05/21/2021    MARNI 9.9 04/07/2022    TSH 1.39 04/07/2022    CRP 0.775 04/17/2017       Anesthesia Plan    ASA Status:  1   NPO Status:  NPO Appropriate    Anesthesia Type: General.     - Airway: LMA   Induction: Propofol.   Maintenance: Balanced.        Consents    Anesthesia Plan(s) and associated risks, benefits, and realistic alternatives discussed. Questions answered and patient/representative(s) expressed understanding.     - Discussed: Risks, Benefits and Alternatives for BOTH SEDATION and the PROCEDURE were discussed     - Discussed with:  Patient      - Extended Intubation/Ventilatory Support Discussed: No.      - Patient is DNR/DNI Status: No    Use of blood products discussed: Yes.     - Discussed with: Patient.     - Consented: consented to blood products            Reason for refusal: other.     Postoperative Care    Pain management: IV analgesics, Multi-modal analgesia.   PONV prophylaxis: Ondansetron (or other 5HT-3), Dexamethasone or Solumedrol     Comments:                TRACI VALE CRNA

## 2022-04-28 ENCOUNTER — HOSPITAL ENCOUNTER (OUTPATIENT)
Dept: OCCUPATIONAL THERAPY | Facility: OTHER | Age: 52
Setting detail: THERAPIES SERIES
Discharge: HOME | End: 2022-04-28
Attending: SPECIALIST
Payer: COMMERCIAL

## 2022-04-28 DIAGNOSIS — M72.0 DUPUYTREN'S CONTRACTURE OF LEFT HAND: ICD-10-CM

## 2022-04-28 PROCEDURE — 97110 THERAPEUTIC EXERCISES: CPT | Mod: GO

## 2022-04-28 PROCEDURE — 97760 ORTHOTIC MGMT&TRAING 1ST ENC: CPT | Mod: GO,XU

## 2022-04-29 ENCOUNTER — DOCUMENTATION ONLY (OUTPATIENT)
Dept: OTHER | Facility: CLINIC | Age: 52
End: 2022-04-29
Payer: COMMERCIAL

## 2022-04-29 ENCOUNTER — OFFICE VISIT (OUTPATIENT)
Dept: ORTHOPEDICS | Facility: OTHER | Age: 52
End: 2022-04-29
Attending: SPECIALIST
Payer: COMMERCIAL

## 2022-04-29 DIAGNOSIS — M72.0 DUPUYTREN'S CONTRACTURE OF BOTH HANDS: Primary | ICD-10-CM

## 2022-04-29 PROCEDURE — 99024 POSTOP FOLLOW-UP VISIT: CPT | Performed by: SPECIALIST

## 2022-04-29 NOTE — PROGRESS NOTES
Visit Date: 2022    HISTORY:  Roque returns for followup 1 week status post digital and palmar fasciectomy of the left small finger.  He is back today, doing well.    PHYSICAL EXAMINATION:  Reveals a full correction.  His digits are healing nicely.    IMPRESSION:  One week status post digital and palmar fasciectomy, left small finger.    PLAN:  Our plan will be to follow him clinically with repeat examination in approximately 1 week for suture removal.  If there are any problems in the interim, he will let us know.    Shawn Mckeon MD        D: 2022   T: 2022   MT: MILADIS    Name:     RAMANA RAPP  MRN:      [id number]        Account:    [id number]   :      1970           Visit Date: 2022     Document: [id number]

## 2022-04-29 NOTE — PROGRESS NOTES
Patient is here for follow up on his left hand.  Korin Agudelo LPN .....................4/29/2022 11:02 AM

## 2022-05-06 ENCOUNTER — OFFICE VISIT (OUTPATIENT)
Dept: ORTHOPEDICS | Facility: OTHER | Age: 52
End: 2022-05-06
Attending: SPECIALIST
Payer: COMMERCIAL

## 2022-05-06 DIAGNOSIS — M72.0 DUPUYTREN'S CONTRACTURE OF BOTH HANDS: Primary | ICD-10-CM

## 2022-05-06 PROCEDURE — 99024 POSTOP FOLLOW-UP VISIT: CPT | Performed by: SPECIALIST

## 2022-05-06 NOTE — PROGRESS NOTES
Patient is here for follow up on his left hand.  Korin Agudelo LPN .....................5/6/2022 12:27 PM

## 2022-05-06 NOTE — PROGRESS NOTES
Visit Date: 2022    HISTORY:  Roque returns for followup 2 weeks status post digital and palmar fasciectomy of his left small finger.  He is back today, doing well.    PHYSICAL EXAMINATION:  Today confirms full correction of his digit, shows mild loss of flexion, but this is improving.    IMPRESSION:  Status post left small finger digital and palmar fasciectomy, doing well.    PLAN:  At this point, we will advance his activities.  We will see him back for followup on an as-needed basis.    Shawn Mckeon MD        D: 2022   T: 2022   MT: BYRON    Name:     RAMANA RAPP  MRN:      [id number]        Account:    [id number]   :      1970           Visit Date: 2022     Document: [id number]

## 2022-05-19 NOTE — PROGRESS NOTES
04/28/22 1400   Quick Adds   Quick Adds Splint Fabrication   Type of Visit Initial Outpatient Occupational Therapy Evaluation   General Information   Start Of Care Date 04/28/22   Referring Physician Dr. Mckeon   Orders Evaluate and treat as indicated   Orders Date 04/22/22   Medical Diagnosis Dupuytren's contracture of left hand (M72.0)   Onset of Illness/Injury or Date of Surgery   (Progressively getting worse over the last year)   Surgical/Medical History Reviewed Yes   Additional Occupational Profile Info/Pertinent History of Current Problem Patient has had progression of L Dupuytren's on his pinky.  He had a Dupuytren's release and fasciectomy from Dr. Mckeon on 4/22.  He is here today for a digit extension splint keeping ring and small fingers in full extension at MCP and PIP joints.   Role/Living Environment   Patient role/Employment history Employed   Community/Avocational Activities Works full time as a  with his own business.  It is now his slow season, which is why he completed this surgery now.  He continues to go into the shop but has limited his L hand use.   Splint Fabrication   Splint Fabricated Left;Finger splint;Functional resting splint   Splint Fabricated Detail Fabricated digit extension splint for digits 4 and 5 of L hand into full extenion at MCP, PIP and DIP.   Wearing Schedule 24/7 until follow-up appointment with surgeon and then as advised.   Skin Assessment Incision looks good, mostly closed.  Sutures in place.   Planned Therapy Interventions   Planned Therapy Interventions Orthotic fitting/training;Stretching   Intervention Comments Duputryen's Release Protocol    OT Goal 1   Goal Identifier HEP   Goal Description Patient will verbalize understanding of splint wear and care instructions and home exercise program by end of first OT session.   Goal Progress Goal Met   Target Date 04/28/22   Date Met 04/28/22   Clinical Impression   Criteria for Skilled Therapeutic  Interventions Met Evaluation only   OT Diagnosis s/p Duputryen's Release   Influenced by the following impairments Decreased functional use of LUE   Assessment of Occupational Performance 1-3 Performance Deficits   Clinical Decision Making (Complexity) Low complexity   Therapy Frequency 1x splint fabrication and treatment only   Risks and Benefits of Treatment have been explained. Yes   Patient, Family & other staff in agreement with plan of care Yes

## 2022-06-13 DIAGNOSIS — E03.4 HYPOTHYROIDISM DUE TO ACQUIRED ATROPHY OF THYROID: ICD-10-CM

## 2022-06-14 NOTE — TELEPHONE ENCOUNTER
Overdue for Annual visit / physical.     Please call patient and schedule.  Okay to use 4 PM -- 40 minute spot at the end of the day if needed.    We can send in a small Prescription refill if needed to get to appointment.     Prieto You MD

## 2022-06-14 NOTE — TELEPHONE ENCOUNTER
Meenakshi sent Rx request for the following:      LEVOTHYROXINE 0.112MG (112MCG) TABS      Last Prescription Date:   5/6/2021  Last Fill Qty/Refills:         90, R-3    Last Office Visit:              4/7/2022   Future Office visit:           none    Darrion Black RN, BSN  ....................  6/14/2022   11:47 AM

## 2022-06-15 RX ORDER — LEVOTHYROXINE SODIUM 112 UG/1
TABLET ORAL
Qty: 90 TABLET | Refills: 3 | Status: SHIPPED | OUTPATIENT
Start: 2022-06-15 | End: 2023-06-17

## 2022-06-15 NOTE — TELEPHONE ENCOUNTER
Contacted patient, he states he was told at his Pre Op on 4/9/22 that he would not need to come back in for his annual visit, that all his labs got taken at that time. He did not want to schedule, wanted note sent onto provider.    Eleni Hall on 6/15/2022 at 12:40 PM

## 2022-07-18 ENCOUNTER — IMMUNIZATION (OUTPATIENT)
Dept: FAMILY MEDICINE | Facility: OTHER | Age: 52
End: 2022-07-18
Attending: FAMILY MEDICINE
Payer: COMMERCIAL

## 2022-07-18 PROCEDURE — 0054A COVID-19,PF,PFIZER (12+ YRS): CPT

## 2022-07-18 PROCEDURE — 91305 COVID-19,PF,PFIZER (12+ YRS): CPT

## 2022-10-03 DIAGNOSIS — M25.511 RIGHT SHOULDER PAIN, UNSPECIFIED CHRONICITY: Primary | ICD-10-CM

## 2022-10-05 ENCOUNTER — OFFICE VISIT (OUTPATIENT)
Dept: ORTHOPEDICS | Facility: OTHER | Age: 52
End: 2022-10-05
Attending: ORTHOPAEDIC SURGERY
Payer: COMMERCIAL

## 2022-10-05 ENCOUNTER — HOSPITAL ENCOUNTER (OUTPATIENT)
Dept: GENERAL RADIOLOGY | Facility: OTHER | Age: 52
Discharge: HOME | End: 2022-10-05
Attending: ORTHOPAEDIC SURGERY
Payer: COMMERCIAL

## 2022-10-05 ENCOUNTER — TELEPHONE (OUTPATIENT)
Dept: INTERNAL MEDICINE | Facility: OTHER | Age: 52
End: 2022-10-05

## 2022-10-05 VITALS — HEIGHT: 68 IN | BODY MASS INDEX: 26.52 KG/M2 | WEIGHT: 175 LBS

## 2022-10-05 DIAGNOSIS — M25.511 RIGHT SHOULDER PAIN, UNSPECIFIED CHRONICITY: ICD-10-CM

## 2022-10-05 DIAGNOSIS — E78.49 FAMILIAL HYPERLIPIDEMIA: ICD-10-CM

## 2022-10-05 DIAGNOSIS — M25.511 CHRONIC RIGHT SHOULDER PAIN: Primary | ICD-10-CM

## 2022-10-05 DIAGNOSIS — G89.29 CHRONIC RIGHT SHOULDER PAIN: Primary | ICD-10-CM

## 2022-10-05 PROCEDURE — 99213 OFFICE O/P EST LOW 20 MIN: CPT | Performed by: ORTHOPAEDIC SURGERY

## 2022-10-05 PROCEDURE — 73030 X-RAY EXAM OF SHOULDER: CPT | Mod: RT

## 2022-10-05 RX ORDER — ATORVASTATIN CALCIUM 10 MG/1
10 TABLET, FILM COATED ORAL DAILY
Qty: 90 TABLET | Refills: 1 | Status: SHIPPED | OUTPATIENT
Start: 2022-10-05

## 2022-10-05 NOTE — PROGRESS NOTES
Visit Date: 10/05/2022    REASON FOR EVALUATION:  Right shoulder.    HISTORY OF PRESENT ILLNESS:  Roque comes in with regards to his right shoulder.  He has been having trouble here for the better part of a year or so.  Seems like it is getting worse over time.  Has limitations in range of motion.  Does have a history of Dupuytren's and predilection for scar tissue as well.  No trauma prior to onset.  He is right-hand dominant.  Works as a .  Does a lot of combination of different activities, which do involve overhead pain.  It does radiate down into his elbow.  He has not undergone any therapy or injections at this time.  Does report limitations of motion.   Difficulty throwing a ball.  Does not recall any injuries prior to onset.    MEDICATIONS:  Reviewed.    ALLERGIES:  NO KNOWN DRUG ALLERGIES.    REVIEW OF SYSTEMS:  A 12-point otherwise negative with the exception as stated above.    PHYSICAL EXAMINATION:    GENERAL:  5 feet 8 inches, 175 pounds.  Alert and oriented x 3, cooperative with exam, in no acute distress.  Does ambulate with satisfactory gait.  Affect is appropriate as well.  MUSCULOSKELETAL:  Examination of the shoulder shows forward elevation about 160, abduction about 160, internal rotation to about hip level, external rotation limited by about the last 30 degrees with his arm in an abducted state.  Pain with impingement testing.  Weakness with abduction, as well as external strength testing.  Neurovascular examination intact.  No instability is otherwise seen or identified at this point in time.    IMAGING:  X-rays have been reviewed.  I do not see any advanced arthritic change, other than type 2 acromion and AC arthritis.    IMPRESSION:  Right shoulder pain, possible cuff pathology with impingement and adhesive capsulitis.    PLAN AND RECOMMENDATIONS:  MRI evaluation.  Pending review of this, make further recommendations for patient here at this time.    All Gallo MD        D:  10/05/2022   T: 10/05/2022   MT: KACI    Name:     RAMANA RAPP  MRN:      [id number]        Account:    [id number]   :      1970           Visit Date: 10/05/2022     Document: [id number]

## 2022-10-05 NOTE — NURSING NOTE
"Chief Complaint   Patient presents with     Consult     Right Shoulder Pain        Pt is here today for right shoulder pain evaluation.     Sierra Zimmerman LPN on 10/5/2022 at 8:45 AM       Initial There were no vitals taken for this visit. Estimated body mass index is 25.11 kg/m  as calculated from the following:    Height as of 4/22/22: 1.778 m (5' 10\").    Weight as of 4/22/22: 79.4 kg (175 lb).  Medication Reconciliation: complete    Sierra Zimmerman LPN  "

## 2022-10-05 NOTE — PROGRESS NOTES
Pt is here today for right shoulder pain evaluation.     Sierra Zimmerman LPN on 10/5/2022 at 8:45 AM

## 2022-10-05 NOTE — TELEPHONE ENCOUNTER
Patient has questions on the statin that he is on.  Please call      Linh Wynn on 10/5/2022 at 11:55 AM

## 2022-10-05 NOTE — TELEPHONE ENCOUNTER
Date of birth and last name verified.    Started  Crestor end of May 2022.     Started having weakness (mostly arms and hands) and pain in his shoulders.    He did play around with the dosing some,  (1/2 tab, every other day etc.) He now has  stopped the medication for a week and now his strength is back and he is not having pain in his shoulder.  Wondering what he can try instead.  Please advise.    Okay to leave a detailed message on his voice mail if he does not answer.    Yoel Monsalve .......  10/5/2022  5:06 PM

## 2022-10-06 NOTE — TELEPHONE ENCOUNTER
Stop the crestor.     1. Familial hyperlipidemia  START:   - atorvastatin (LIPITOR) 10 MG tablet; Take 1 tablet (10 mg) by mouth daily - for Familial Hyperlipidemia         If he wants to start with Lipitor -- Monday,Wed,Fri... that's okay, and increase as tolerated.     Electronically signed by:  Prieto You MD  on October 5, 2022 7:21 PM

## 2022-10-07 NOTE — TELEPHONE ENCOUNTER
After verifying last name and  patient notifed of the below information.   Iwona Handy LPN on 10/7/2022 at 9:29 AM

## 2022-10-24 ENCOUNTER — TELEPHONE (OUTPATIENT)
Dept: ORTHOPEDICS | Facility: OTHER | Age: 52
End: 2022-10-24

## 2022-10-24 DIAGNOSIS — M25.511 CHRONIC RIGHT SHOULDER PAIN: Primary | ICD-10-CM

## 2022-10-24 DIAGNOSIS — G89.29 CHRONIC RIGHT SHOULDER PAIN: Primary | ICD-10-CM

## 2022-10-24 NOTE — TELEPHONE ENCOUNTER
Roque called today to ask what his next steps should be. He said the MRI was declined, and is wondering if something else can be done, or if he should just meet with Cleo again?    Thank you,    Heather Coronado on 10/24/2022 at 10:27 AM

## 2022-11-23 ENCOUNTER — HOSPITAL ENCOUNTER (OUTPATIENT)
Dept: PHYSICAL THERAPY | Facility: OTHER | Age: 52
Setting detail: THERAPIES SERIES
Discharge: HOME | End: 2022-11-23
Attending: ORTHOPAEDIC SURGERY
Payer: COMMERCIAL

## 2022-11-23 DIAGNOSIS — M25.511 CHRONIC RIGHT SHOULDER PAIN: ICD-10-CM

## 2022-11-23 DIAGNOSIS — G89.29 CHRONIC RIGHT SHOULDER PAIN: ICD-10-CM

## 2022-11-23 PROCEDURE — 97161 PT EVAL LOW COMPLEX 20 MIN: CPT | Mod: GP

## 2022-11-23 PROCEDURE — 97110 THERAPEUTIC EXERCISES: CPT | Mod: GP

## 2022-11-23 NOTE — PROGRESS NOTES
11/23/22 1300   General Information   Type of Visit Initial OP Ortho PT Evaluation   Start of Care Date 11/23/22   Referring Physician Dr. Gallo   Patient/Family Goals Statement to decrease pain and move better   Orders Evaluate and Treat   Date of Order 10/24/22   Certification Required? No   Medical Diagnosis M25.511, G89.29 (ICD-10-CM) - Chronic right shoulder pain   Body Part(s)   Body Part(s) Shoulder   Presentation and Etiology   Pertinent history of current problem (include personal factors and/or comorbidities that impact the POC) Roque hess with chronic R shoulder pain (maybe a year?), has gotten better in the past month since he started stretching it with wall walk and weighted pendulums. He was waking at night with pain but not anymore.  He is still limited in his reaching but it's getting better. He has pain/limitations mostly working overhead still or reaching behind his back or across his body. Dr. Castle did want to do an MRI to rule out tear but insurance wouldn't cover at this time.   Impairments A. Pain;E. Decreased flexibility;F. Decreased strength and endurance   Functional Limitations perform activities of daily living;perform required work activities;perform desired leisure / sports activities   Symptom Location R shoulder into biceps   How/Where did it occur From insidious onset   Onset date of current episode/exacerbation 11/23/21   Chronicity Chronic   Pain rating (0-10 point scale) Best (/10);Worst (/10)   Best (/10) 0   Worst (/10) 4   Pain quality B. Dull   Frequency of pain/symptoms C. With activity   Pain/symptoms exacerbated by G. Certain positions;H. Overhead reach   Pain/symptoms eased by E. Changing positions   Progression of symptoms since onset: Improved   Current Level of Function   Patient role/employment history A. 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 No   Have you fallen and had an injury in the past year? No   Is patient a fall risk? No   Abuse Screen (yes response referral indicated)   Feels Unsafe at Home or Work/School no   Shoulder Objective Findings   Cervical Screen (ROM, quadrant) full   Shoulder ROM Comment standing shoulder flexion: L 155, R 110, abduction: L 140, R 100, supine shoulder ER: L 77, R 42, supine IR: L 58, R 47.   Posture forward head and shoulder, able to correct with cuing- discussed improtance of posture   Planned Therapy Interventions   Planned Therapy Interventions manual therapy;neuromuscular re-education;ROM;strengthening;stretching   Planned Modality Interventions   Planned Modality Interventions Ultrasound  (if needed)   Clinical Impression   Criteria for Skilled Therapeutic Interventions Met yes, treatment indicated   PT Diagnosis R shoulder pain   Influenced by the following impairments pain, weakness, impingement, tight capsule   Functional limitations due to impairments limited reaching, limited lifting, limited dressing   Clinical Presentation Stable/Uncomplicated   Clinical Presentation Rationale improving, active person   Clinical Decision Making (Complexity) Low complexity   Therapy Frequency 1 time/week   Predicted Duration of Therapy Intervention (days/wks) 3-4 visits over 4-6 weeks   Risk & Benefits of therapy have been explained Yes   Patient, Family & other staff in agreement with plan of care Yes   Education Assessment   Barriers to Learning No barriers   ORTHO GOALS   PT Ortho Eval Goals 1;2   Ortho Goal 1   Goal Identifier reaching   Goal Description Pt able to reach R shoulder flexion of 130 degrees to be able to reach roof of work truck for grab bar in 6 weeks.   Target Date 01/04/23   Ortho Goal 2   Goal Identifier HEP   Goal Description Pt independent with further progression of HEP to continue to improve shoulder function and pain in 6 weeks.    Target Date 01/04/23   Total Evaluation Time   PT Eval, Low Complexity Minutes (28038) 20

## 2022-12-08 ENCOUNTER — ALLIED HEALTH/NURSE VISIT (OUTPATIENT)
Dept: FAMILY MEDICINE | Facility: OTHER | Age: 52
End: 2022-12-08
Attending: INTERNAL MEDICINE
Payer: COMMERCIAL

## 2022-12-08 DIAGNOSIS — Z23 NEEDS FLU SHOT: Primary | ICD-10-CM

## 2022-12-08 PROCEDURE — 90471 IMMUNIZATION ADMIN: CPT

## 2022-12-08 PROCEDURE — 90682 RIV4 VACC RECOMBINANT DNA IM: CPT

## 2022-12-09 ENCOUNTER — HOSPITAL ENCOUNTER (OUTPATIENT)
Dept: PHYSICAL THERAPY | Facility: OTHER | Age: 52
Setting detail: THERAPIES SERIES
Discharge: HOME | End: 2022-12-09
Attending: ORTHOPAEDIC SURGERY
Payer: COMMERCIAL

## 2022-12-09 PROCEDURE — 97110 THERAPEUTIC EXERCISES: CPT | Mod: GP

## 2023-06-12 NOTE — TELEPHONE ENCOUNTER
Left message to return to Clinic as he forgot to sign the cologuard order sheet.  Dena Chris LPN on 6/13/2019 at 12:45 PM     Spoke with patient and conveyed the below. He expressed understanding, no further questions/concerns at this time. Will update us on if there is coverage issues with the medication/side effects once starting.

## 2023-06-27 ENCOUNTER — TELEPHONE (OUTPATIENT)
Dept: INTERNAL MEDICINE | Facility: OTHER | Age: 53
End: 2023-06-27
Payer: COMMERCIAL

## 2023-06-27 NOTE — TELEPHONE ENCOUNTER
After proper verification, patient stated that he needed more Levothyroxine because his appointment wasn't until September.  Writer stated that he had 4 refills and that should make it thru until his appointment but call back if he is running out before than.  Dena Chris LPN on 6/27/2023 at 3:11 PM

## 2023-07-10 NOTE — PROGRESS NOTES
DISCHARGE  Reason for Discharge: Patient chooses to discontinue therapy.    Equipment Issued: na    Discharge Plan: Patient to continue home program.    Referring Provider:  All Gallo     12/09/22 0800   Appointment Info   Signing clinician's name / credentials Ethel Guevara, DPT   Visits Used 2   Subjective Report   Subjective Report Shouldn't hasn't been painful, more just still stiffness.  Strength coming back little bit.  Toughest is IR behind back. He feels comfortable to continue on his own,we'll keep chart open x1 month in case of exacerbation.   Objective Measures   Objective Measures Objective Measure 1;Objective Measure 2   Objective Measure 1   Objective Measure R shoulder pain   Details 2-3/10 at most   Objective Measure 2   Objective Measure shoulder ROM   Details flex 132, abd 142   Treatment Interventions (PT)   Interventions Therapeutic Procedure/Exercise   Therapeutic Procedure/Exercise   Therapeutic Procedures: strength, endurance, ROM, flexibillity minutes (79841) 30   Skilled Intervention increase ROM, function, strength   Patient Response/Progress good   Education   Learner/Method Patient   Education Comments RemCare code: [id number]   Plan   Homework ER green band x20, IR double billy band x20, scaption (limit to pain free ROM to avoid shoulder hiking) 1# x20 (L 2#), doorway ER stretch 2x30 sec, supine ER with hands behind back for alternate stretch and flex 2 hands AAROM for alternate stretch, standing IR with L hand pushing further 2x30 sec, wall walk flex stretch plus inferior capsule glides x30 sec, side wall walk for abduction 2x30 sec   Medicare Claim Information   Medical Diagnosis M25.511, G89.29 (ICD-10-CM) - Chronic right shoulder pain   PT Diagnosis R shoulder pain   Start of Care Date 11/23/22   Onset date of current episode/exacerbation 11/23/21   Ortho Goal 1   Goal Identifier reaching   Goal Description Pt able to reach R shoulder flexion of 130 degrees to be able to  reach roof of work truck for grab bar in 6 weeks.   Target Date 01/04/23   Ortho Goal 2   Goal Identifier HEP   Goal Description Pt independent with further progression of HEP to continue to improve shoulder function and pain in 6 weeks.   Target Date 01/04/23

## 2023-09-03 DIAGNOSIS — E03.4 HYPOTHYROIDISM DUE TO ACQUIRED ATROPHY OF THYROID: ICD-10-CM

## 2023-09-07 RX ORDER — LEVOTHYROXINE SODIUM 112 UG/1
112 TABLET ORAL DAILY
Qty: 4 TABLET | Refills: 0 | Status: SHIPPED | OUTPATIENT
Start: 2023-09-07 | End: 2023-09-12

## 2023-09-07 NOTE — TELEPHONE ENCOUNTER
Patient called today  he only has 2 of these pills left.  He needs a few more to make it until his apt.          Wilma Ventura on 9/7/2023 at 10:34 AM

## 2023-09-07 NOTE — TELEPHONE ENCOUNTER
Next 5 appointments (look out 90 days)      Sep 12, 2023  9:20 AM  PHYSICAL with TRACI Mckeon [facility] ([facility] ) [address]  [place] [address]  [phone]          Last prescription:   Disp Refills Start End WESTLEY   levothyroxine (SYNTHROID/LEVOTHROID) 112 MCG tablet 15 tablet 4 6/17/2023  No     Pending for #4 tablets. Charity Reza RN .............. 9/7/2023  11:06 AM

## 2023-09-12 ENCOUNTER — OFFICE VISIT (OUTPATIENT)
Dept: INTERNAL MEDICINE | Facility: OTHER | Age: 53
End: 2023-09-12
Payer: COMMERCIAL

## 2023-09-12 VITALS
TEMPERATURE: 98.5 F | BODY MASS INDEX: 26.53 KG/M2 | WEIGHT: 179.13 LBS | OXYGEN SATURATION: 97 % | HEIGHT: 69 IN | DIASTOLIC BLOOD PRESSURE: 82 MMHG | SYSTOLIC BLOOD PRESSURE: 130 MMHG | HEART RATE: 66 BPM

## 2023-09-12 DIAGNOSIS — E78.49 FAMILIAL HYPERLIPIDEMIA: ICD-10-CM

## 2023-09-12 DIAGNOSIS — E03.4 HYPOTHYROIDISM DUE TO ACQUIRED ATROPHY OF THYROID: ICD-10-CM

## 2023-09-12 DIAGNOSIS — Z12.5 ENCOUNTER FOR SCREENING FOR MALIGNANT NEOPLASM OF PROSTATE: ICD-10-CM

## 2023-09-12 DIAGNOSIS — Z00.00 ROUTINE GENERAL MEDICAL EXAMINATION AT A HEALTH CARE FACILITY: ICD-10-CM

## 2023-09-12 DIAGNOSIS — Z13.220 SCREENING FOR LIPID DISORDERS: ICD-10-CM

## 2023-09-12 DIAGNOSIS — Z12.5 SCREENING PSA (PROSTATE SPECIFIC ANTIGEN): ICD-10-CM

## 2023-09-12 DIAGNOSIS — Z71.85 VACCINE COUNSELING: Primary | ICD-10-CM

## 2023-09-12 LAB
ALBUMIN SERPL BCG-MCNC: 4.8 G/DL (ref 3.5–5.2)
ALP SERPL-CCNC: 68 U/L (ref 40–129)
ALT SERPL W P-5'-P-CCNC: 28 U/L (ref 0–70)
ANION GAP SERPL CALCULATED.3IONS-SCNC: 10 MMOL/L (ref 7–15)
AST SERPL W P-5'-P-CCNC: 26 U/L (ref 0–45)
BASOPHILS # BLD AUTO: 0 10E3/UL (ref 0–0.2)
BASOPHILS NFR BLD AUTO: 1 %
BILIRUB SERPL-MCNC: 0.6 MG/DL
BUN SERPL-MCNC: 16.7 MG/DL (ref 6–20)
CALCIUM SERPL-MCNC: 9.5 MG/DL (ref 8.6–10)
CHLORIDE SERPL-SCNC: 100 MMOL/L (ref 98–107)
CHOLEST SERPL-MCNC: 258 MG/DL
CREAT SERPL-MCNC: 0.85 MG/DL (ref 0.67–1.17)
DEPRECATED HCO3 PLAS-SCNC: 28 MMOL/L (ref 22–29)
EGFRCR SERPLBLD CKD-EPI 2021: >90 ML/MIN/1.73M2
EOSINOPHIL # BLD AUTO: 0.1 10E3/UL (ref 0–0.7)
EOSINOPHIL NFR BLD AUTO: 1 %
ERYTHROCYTE [DISTWIDTH] IN BLOOD BY AUTOMATED COUNT: 12.5 % (ref 10–15)
GLUCOSE SERPL-MCNC: 109 MG/DL (ref 70–99)
HCT VFR BLD AUTO: 44.4 % (ref 40–53)
HDLC SERPL-MCNC: 41 MG/DL
HGB BLD-MCNC: 15.3 G/DL (ref 13.3–17.7)
IMM GRANULOCYTES # BLD: 0 10E3/UL
IMM GRANULOCYTES NFR BLD: 0 %
LDLC SERPL CALC-MCNC: 191 MG/DL
LYMPHOCYTES # BLD AUTO: 1.8 10E3/UL (ref 0.8–5.3)
LYMPHOCYTES NFR BLD AUTO: 42 %
MCH RBC QN AUTO: 30.7 PG (ref 26.5–33)
MCHC RBC AUTO-ENTMCNC: 34.5 G/DL (ref 31.5–36.5)
MCV RBC AUTO: 89 FL (ref 78–100)
MONOCYTES # BLD AUTO: 0.5 10E3/UL (ref 0–1.3)
MONOCYTES NFR BLD AUTO: 11 %
NEUTROPHILS # BLD AUTO: 1.9 10E3/UL (ref 1.6–8.3)
NEUTROPHILS NFR BLD AUTO: 45 %
NONHDLC SERPL-MCNC: 217 MG/DL
NRBC # BLD AUTO: 0 10E3/UL
NRBC BLD AUTO-RTO: 0 /100
PLATELET # BLD AUTO: 171 10E3/UL (ref 150–450)
POTASSIUM SERPL-SCNC: 4.2 MMOL/L (ref 3.4–5.3)
PROT SERPL-MCNC: 7.2 G/DL (ref 6.4–8.3)
PSA SERPL DL<=0.01 NG/ML-MCNC: 1.34 NG/ML (ref 0–3.5)
RBC # BLD AUTO: 4.99 10E6/UL (ref 4.4–5.9)
SODIUM SERPL-SCNC: 138 MMOL/L (ref 136–145)
TRIGL SERPL-MCNC: 128 MG/DL
TSH SERPL DL<=0.005 MIU/L-ACNC: 1.63 UIU/ML (ref 0.3–4.2)
WBC # BLD AUTO: 4.3 10E3/UL (ref 4–11)

## 2023-09-12 PROCEDURE — 85025 COMPLETE CBC W/AUTO DIFF WBC: CPT | Mod: ZL

## 2023-09-12 PROCEDURE — 99214 OFFICE O/P EST MOD 30 MIN: CPT | Mod: 25

## 2023-09-12 PROCEDURE — 80061 LIPID PANEL: CPT | Mod: ZL

## 2023-09-12 PROCEDURE — 36415 COLL VENOUS BLD VENIPUNCTURE: CPT | Mod: ZL

## 2023-09-12 PROCEDURE — 90715 TDAP VACCINE 7 YRS/> IM: CPT

## 2023-09-12 PROCEDURE — 84443 ASSAY THYROID STIM HORMONE: CPT | Mod: ZL

## 2023-09-12 PROCEDURE — 99396 PREV VISIT EST AGE 40-64: CPT | Mod: 25

## 2023-09-12 PROCEDURE — 90471 IMMUNIZATION ADMIN: CPT

## 2023-09-12 PROCEDURE — 84153 ASSAY OF PSA TOTAL: CPT | Mod: ZL

## 2023-09-12 PROCEDURE — 80053 COMPREHEN METABOLIC PANEL: CPT | Mod: ZL

## 2023-09-12 RX ORDER — LEVOTHYROXINE SODIUM 112 UG/1
112 TABLET ORAL DAILY
Qty: 90 TABLET | OUTPATIENT
Start: 2023-09-12

## 2023-09-12 RX ORDER — LEVOTHYROXINE SODIUM 112 UG/1
112 TABLET ORAL DAILY
Qty: 4 TABLET | Refills: 0 | Status: SHIPPED | OUTPATIENT
Start: 2023-09-12 | End: 2023-09-12

## 2023-09-12 RX ORDER — LEVOTHYROXINE SODIUM 112 UG/1
112 TABLET ORAL DAILY
Qty: 90 TABLET | Refills: 4 | Status: SHIPPED | OUTPATIENT
Start: 2023-09-12

## 2023-09-12 ASSESSMENT — ENCOUNTER SYMPTOMS
HEARTBURN: 0
SORE THROAT: 0
PARESTHESIAS: 0
DIARRHEA: 0
DIZZINESS: 0
NAUSEA: 0
EYE PAIN: 0
ABDOMINAL PAIN: 0
HEMATOCHEZIA: 0
CHILLS: 0
JOINT SWELLING: 0
SHORTNESS OF BREATH: 0
MYALGIAS: 0
FEVER: 0
PALPITATIONS: 0
CONSTIPATION: 0
WEAKNESS: 0
HEADACHES: 0
NERVOUS/ANXIOUS: 0
DYSURIA: 0
HEMATURIA: 0
COUGH: 0
ARTHRALGIAS: 0
FREQUENCY: 0

## 2023-09-12 ASSESSMENT — PAIN SCALES - GENERAL: PAINLEVEL: NO PAIN (0)

## 2023-09-12 NOTE — TELEPHONE ENCOUNTER
Patient called stating he is out of medication and leaving for [place]. Pt waiting at pharmacy.    [facility] sent Rx request for the following:      Requested Prescriptions   Pending Prescriptions Disp Refills    levothyroxine (SYNTHROID/LEVOTHROID) 112 MCG tablet [Pharmacy Med Name: LEVOTHYROXINE 0.112MG (112MCG) TABS] 90 tablet      Sig: TAKE 1 TABLET(112 MCG) BY MOUTH DAILY       Thyroid Protocol Failed - 9/12/2023 10:28 AM     Failed - Normal TSH on file in past 12 months     Recent Labs   Lab Test 04/07/22  1100   TSH 1.39        Last Prescription Date:   9/12/23  Last Fill Qty/Refills:         4, R-0 (small qty to get Pt through to today's appointment)    Last Office Visit:              Today (Jai)  Future Office visit:           None    Charity Reza RN .............. 9/12/2023  10:33 AM

## 2023-09-12 NOTE — NURSING NOTE
"Patient here for annual physical and labs.  Needs medication refills.  Nasreen Chen LPN on 9/12/2023 at 9:35 AM    Chief Complaint   Patient presents with    Physical       Initial /82   Pulse 66   Temp 98.5  F (36.9  C)   Ht 1.753 m (5' 9\")   Wt 81.3 kg (179 lb 2 oz)   SpO2 97%   BMI 26.45 kg/m   Estimated body mass index is 26.45 kg/m  as calculated from the following:    Height as of this encounter: 1.753 m (5' 9\").    Weight as of this encounter: 81.3 kg (179 lb 2 oz).  Medication Reconciliation: complete    FOOD SECURITY SCREENING QUESTIONS  Hunger Vital Signs:  Within the past 12 months we worried whether our food would run out before we got money to buy more. Never  Within the past 12 months the food we bought just didn't last and we didn't have money to get more. Never  Nasreen Chen LPN 9/12/2023 9:35 AM   "

## 2023-09-12 NOTE — PROGRESS NOTES
SUBJECTIVE:   CC: Roque is an 53 year old who presents for preventative health visit.     Healthy Habits:     Getting at least 3 servings of Calcium per day:  Yes    Bi-annual eye exam:  NO    Dental care twice a year:  Yes    Sleep apnea or symptoms of sleep apnea:  None    Diet:  Regular (no restrictions)    Frequency of exercise:  2-3 days/week    Duration of exercise:  15-30 minutes    Taking medications regularly:  Yes    Medication side effects:  None    Additional concerns today:  No      Today's PHQ-2 Score:       9/12/2023     9:21 AM   PHQ-2 ( 1999 [employer])   Q1: Little interest or pleasure in doing things 0   Q2: Feeling down, depressed or hopeless 0   PHQ-2 Score 0   Q1: Little interest or pleasure in doing things Not at all   Q2: Feeling down, depressed or hopeless Not at all   PHQ-2 Score 0         Social History     Tobacco Use    Smoking status: Never    Smokeless tobacco: Current     Types: Chew   Substance Use Topics    Alcohol use: Yes     Alcohol/week: 4.0 standard drinks of alcohol     Comment: occasional             9/12/2023     9:20 AM   Alcohol Use   Prescreen: >3 drinks/day or >7 drinks/week? No       Last PSA:   PSA Tumor Marker   Date Value Ref Range Status   04/07/2022 0.95 0.00 - 4.00 ug/L Final       Reviewed orders with patient. Reviewed health maintenance and updated orders accordingly - Yes      Reviewed and updated as needed this visit by clinical staff   Tobacco  Allergies               Reviewed and updated as needed this visit by Provider                     Review of Systems   Constitutional:  Negative for chills and fever.   HENT:  Negative for congestion, ear pain, hearing loss and sore throat.    Eyes:  Negative for pain and visual disturbance.   Respiratory:  Negative for cough and shortness of breath.    Cardiovascular:  Negative for chest pain, palpitations and peripheral edema.   Gastrointestinal:  Negative for abdominal pain, constipation, diarrhea, heartburn,  "hematochezia and nausea.   Genitourinary:  Negative for dysuria, frequency, genital sores, hematuria, impotence, penile discharge and urgency.   Musculoskeletal:  Negative for arthralgias, joint swelling and myalgias.   Skin:  Negative for rash.   Neurological:  Negative for dizziness, weakness, headaches and paresthesias.   Psychiatric/Behavioral:  Negative for mood changes. The patient is not nervous/anxious.          OBJECTIVE:   /82   Pulse 66   Temp 98.5  F (36.9  C)   Ht 1.753 m (5' 9\")   Wt 81.3 kg (179 lb 2 oz)   SpO2 97%   BMI 26.45 kg/m      Physical Exam  Constitutional:       General: He is not in acute distress.     Appearance: He is well-developed. He is not diaphoretic.   HENT:      Head: Normocephalic and atraumatic.   Eyes:      General: No scleral icterus.     Conjunctiva/sclera: Conjunctivae normal.   Neck:      Vascular: No carotid bruit.   Cardiovascular:      Rate and Rhythm: Normal rate and regular rhythm.      Pulses: Normal pulses.   Pulmonary:      Effort: Pulmonary effort is normal.      Breath sounds: Normal breath sounds.   Abdominal:      Palpations: Abdomen is soft.      Tenderness: There is no abdominal tenderness.   Musculoskeletal:         General: Deformity (Dupuytren's contracture of both hands) present.      Cervical back: Neck supple.      Right lower leg: No edema.      Left lower leg: No edema.      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   Skin:     General: Skin is warm and dry.      Findings: No rash.   Neurological:      Mental Status: He is alert and oriented to person, place, and time. Mental status is at baseline.   Psychiatric:         Mood and Affect: Mood normal.         Behavior: Behavior normal.           Diagnostic Test Results:  Labs reviewed in Epic  Results for orders placed or performed in visit on 09/12/23 (from the past 24 hour(s))   TSH Reflex GH   Result Value Ref Range    TSH 1.63 0.30 - " 4.20 uIU/mL   CBC and Differential    Narrative    The following orders were created for panel order CBC and Differential.  Procedure                               Abnormality         Status                     ---------                               -----------         ------                     CBC with platelets and d...[[id number]]                      Final result                 Please view results for these tests on the individual orders.   Comprehensive Metabolic Panel   Result Value Ref Range    Sodium 138 136 - 145 mmol/L    Potassium 4.2 3.4 - 5.3 mmol/L    Chloride 100 98 - 107 mmol/L    Carbon Dioxide (CO2) 28 22 - 29 mmol/L    Anion Gap 10 7 - 15 mmol/L    Urea Nitrogen 16.7 6.0 - 20.0 mg/dL    Creatinine 0.85 0.67 - 1.17 mg/dL    Calcium 9.5 8.6 - 10.0 mg/dL    Glucose 109 (H) 70 - 99 mg/dL    Alkaline Phosphatase 68 40 - 129 U/L    AST 26 0 - 45 U/L    ALT 28 0 - 70 U/L    Protein Total 7.2 6.4 - 8.3 g/dL    Albumin 4.8 3.5 - 5.2 g/dL    Bilirubin Total 0.6 <=1.2 mg/dL    GFR Estimate >90 >60 mL/min/1.73m2   Lipid Panel   Result Value Ref Range    Cholesterol 258 (H) <200 mg/dL    Triglycerides 128 <150 mg/dL    Direct Measure HDL 41 >=40 mg/dL    LDL Cholesterol Calculated 191 (H) <=100 mg/dL    Non HDL Cholesterol 217 (H) <130 mg/dL    Narrative    Cholesterol  Desirable:  <200 mg/dL    Triglycerides  Normal:  Less than 150 mg/dL  Borderline High:  150-199 mg/dL  High:  200-499 mg/dL  Very High:  Greater than or equal to 500 mg/dL    Direct Measure HDL  Female:  Greater than or equal to 50 mg/dL   Male:  Greater than or equal to 40 mg/dL    LDL Cholesterol  Desirable:  <100mg/dL  Above Desirable:  100-129 mg/dL   Borderline High:  130-159 mg/dL   High:  160-189 mg/dL   Very High:  >= 190 mg/dL    Non HDL Cholesterol  Desirable:  130 mg/dL  Above Desirable:  130-159 mg/dL  Borderline High:  160-189 mg/dL  High:  190-219 mg/dL  Very High:  Greater than or equal to 220 mg/dL   PSA Screen GH   Result  Value Ref Range    Prostate Specific Antigen Screen 1.34 0.00 - 3.50 ng/mL    Narrative    This result is obtained using the Roche Elecsys total PSA method on the dm [id number] immunoassay analyzer. Results obtained with different assay methods or kits cannot be used interchangeably.   CBC with platelets and differential   Result Value Ref Range    WBC Count 4.3 4.0 - 11.0 10e3/uL    RBC Count 4.99 4.40 - 5.90 10e6/uL    Hemoglobin 15.3 13.3 - 17.7 g/dL    Hematocrit 44.4 40.0 - 53.0 %    MCV 89 78 - 100 fL    MCH 30.7 26.5 - 33.0 pg    MCHC 34.5 31.5 - 36.5 g/dL    RDW 12.5 10.0 - 15.0 %    Platelet Count 171 150 - 450 10e3/uL    % Neutrophils 45 %    % Lymphocytes 42 %    % Monocytes 11 %    % Eosinophils 1 %    % Basophils 1 %    % Immature Granulocytes 0 %    NRBCs per 100 WBC 0 <1 /100    Absolute Neutrophils 1.9 1.6 - 8.3 10e3/uL    Absolute Lymphocytes 1.8 0.8 - 5.3 10e3/uL    Absolute Monocytes 0.5 0.0 - 1.3 10e3/uL    Absolute Eosinophils 0.1 0.0 - 0.7 10e3/uL    Absolute Basophils 0.0 0.0 - 0.2 10e3/uL    Absolute Immature Granulocytes 0.0 <=0.4 10e3/uL    Absolute NRBCs 0.0 10e3/uL       ASSESSMENT/PLAN:   Assessment & Plan   Candido Almeida is a 53 year old presenting for the following health issues:      ICD-10-CM    1. Vaccine counseling  Z71.85 GH IMM - TDAP (ADACEL, BOOSTRIX)      2. Hypothyroidism due to acquired atrophy of thyroid  E03.4 TSH Reflex GH     TSH Reflex GH     DISCONTINUED: levothyroxine (SYNTHROID/LEVOTHROID) 112 MCG tablet      3. Routine general medical examination at a health care facility  Z00.00 CBC and Differential     Comprehensive Metabolic Panel     CBC and Differential     Comprehensive Metabolic Panel      4. Screening for lipid disorders  Z13.220 Lipid Panel     Lipid Panel      5. Encounter for screening for malignant neoplasm of prostate  Z12.5       6. Screening PSA (prostate specific antigen)  Z12.5 PSA Screen GH     PSA Screen GH          MIXED HYPERLIPIDEMIA.  Ongoing.  "LDL is at goal: No. Triglycerides are at goal: Yes. Hopefully lifestyle modifications will improve cholesterol levels, otherwise we will need to consider additional medication dose adjustments or medication     The 10-year ASCVD risk score (Manju BONNER, et al., 2019) is: 9.1%    Values used to calculate the score:      Age: 53 years      Sex: Male      Is Non- : No      Diabetic: No      Tobacco smoker: No      Systolic Blood Pressure: 130 mmHg      Is BP treated: No      HDL Cholesterol: 41 mg/dL      Total Cholesterol: 258 mg/dL     Patient has not started statin as prescribed in the past.  Given his ASCVD risk score statin therapy is indicated.  Instructed patient that if he starts a statin to come back in to have his cholesterol and CMP rechecked.  Lab orders have been placed for this.  Provided education regarding his cholesterol levels.       HYPOTHYROIDISM - Patient has a longstanding history of chronic hypothyroidism. Patient has been doing reasonably well. Reports: denies fatigue, weight changes, heat/cold intolerance, bowel/skin changes or CVS symptoms.  Continue: synthroid 112mcg oral thyroid replacement, denies adverse medication reactions or side effects.                       TSH   Date Value Ref Range Status   09/12/2023 1.63 0.30 - 4.20 uIU/mL Final   04/07/2022 1.39 0.40 - 4.00 mU/L Final        Vaccine counseling completed.  Encouraged routine / annual vaccinations.      No follow-ups on file.    TRACI Mckeon [facility]      Patient has been advised of split billing requirements and indicates understanding: Yes      COUNSELING:   Reviewed preventive health counseling, as reflected in patient instructions      BMI:   Estimated body mass index is 26.45 kg/m  as calculated from the following:    Height as of this encounter: 1.753 m (5' 9\").    Weight as of this encounter: 81.3 kg (179 lb 2 oz).   Weight management plan: Discussed healthy diet " and exercise guidelines      He reports that he has never smoked. His smokeless tobacco use includes chew.            TRACI Mckeon [facility]

## 2023-09-14 RX ORDER — LEVOTHYROXINE SODIUM 112 UG/1
112 TABLET ORAL DAILY
Qty: 90 TABLET | OUTPATIENT
Start: 2023-09-14

## 2023-09-25 RX ORDER — LEVOTHYROXINE SODIUM 112 UG/1
112 TABLET ORAL DAILY
Qty: 90 TABLET | Refills: 0 | OUTPATIENT
Start: 2023-09-25

## 2023-09-25 NOTE — TELEPHONE ENCOUNTER
Medication was filled on 9/12/23 for 90 tabs and 4 refills. Will decline refill. Ida Hinkle RN on 9/25/2023 at 9:04 AM

## 2024-10-31 NOTE — PROGRESS NOTES
Prior to immunization administration, verified patients identity using patient s name and date of birth. Please see Immunization Activity for additional information.     Is the patient's temperature normal (100.5 or less)? Yes     Patient MEETS CRITERIA. PROCEED with vaccine administration.      Patient instructed to remain in clinic for 15 minutes afterwards, and to report any adverse reactions.      Link to Ancillary Visit Immunization Standing Orders SmartSet     Screening performed by Kristine Morgan LPN on 10/31/2024 at 3:48 PM.          Prior to immunization administration, verified patients identity using patient s name and date of birth. Please see Immunization Activity for additional information.     Is the patient's temperature normal (100.5 or less)? Yes     Patient MEETS CRITERIA. PROCEED with vaccine administration.      Patient instructed to remain in clinic for 15 minutes afterwards, and to report any adverse reactions.      Link to Ancillary Visit Immunization Standing Orders SmartSet     Screening performed by Kristine Morgan LPN on 11/1/2024 at 11:36 AM.          Prior to immunization administration, verified patients identity using patient s name and date of birth. Please see Immunization Activity for additional information.     Is the patient's temperature normal (100.5 or less)? Yes     Patient MEETS CRITERIA. PROCEED with vaccine administration.      Patient instructed to remain in clinic for 15 minutes afterwards, and to report any adverse reactions.      Link to Ancillary Visit Immunization Standing Orders SmartSet     Screening performed by Kristine Morgan LPN on 11/1/2024 at 11:37 AM.

## 2024-11-01 ENCOUNTER — IMMUNIZATION (OUTPATIENT)
Dept: FAMILY MEDICINE | Facility: OTHER | Age: 54
End: 2024-11-01
Attending: INTERNAL MEDICINE
Payer: COMMERCIAL

## 2024-11-01 DIAGNOSIS — Z23 ENCOUNTER FOR IMMUNIZATION: Primary | ICD-10-CM

## 2024-11-01 PROCEDURE — 90471 IMMUNIZATION ADMIN: CPT

## 2024-11-01 PROCEDURE — 90480 ADMN SARSCOV2 VAC 1/ONLY CMP: CPT

## 2024-11-01 PROCEDURE — 90673 RIV3 VACCINE NO PRESERV IM: CPT

## 2024-11-01 PROCEDURE — 91320 SARSCV2 VAC 30MCG TRS-SUC IM: CPT

## 2024-11-21 ENCOUNTER — OFFICE VISIT (OUTPATIENT)
Dept: INTERNAL MEDICINE | Facility: OTHER | Age: 54
End: 2024-11-21
Attending: INTERNAL MEDICINE
Payer: COMMERCIAL

## 2024-11-21 VITALS
SYSTOLIC BLOOD PRESSURE: 134 MMHG | RESPIRATION RATE: 12 BRPM | HEART RATE: 72 BPM | BODY MASS INDEX: 28.57 KG/M2 | HEIGHT: 66 IN | TEMPERATURE: 97.1 F | OXYGEN SATURATION: 98 % | WEIGHT: 177.8 LBS | DIASTOLIC BLOOD PRESSURE: 79 MMHG

## 2024-11-21 DIAGNOSIS — Z91.89 AT HIGH RISK FOR TICK BORNE ILLNESS: ICD-10-CM

## 2024-11-21 DIAGNOSIS — E03.4 HYPOTHYROIDISM DUE TO ACQUIRED ATROPHY OF THYROID: ICD-10-CM

## 2024-11-21 DIAGNOSIS — E78.49 FAMILIAL HYPERLIPIDEMIA: ICD-10-CM

## 2024-11-21 DIAGNOSIS — Z71.85 VACCINE COUNSELING: ICD-10-CM

## 2024-11-21 DIAGNOSIS — Z12.5 ENCOUNTER FOR SCREENING FOR MALIGNANT NEOPLASM OF PROSTATE: ICD-10-CM

## 2024-11-21 DIAGNOSIS — Z82.49 FAMILY HISTORY OF HEART DISEASE IN MALE FAMILY MEMBER BEFORE AGE 55: ICD-10-CM

## 2024-11-21 DIAGNOSIS — Z00.00 ANNUAL PHYSICAL EXAM: Primary | ICD-10-CM

## 2024-11-21 LAB
ALBUMIN SERPL BCG-MCNC: 4.8 G/DL (ref 3.5–5.2)
ALP SERPL-CCNC: 52 U/L (ref 40–150)
ALT SERPL W P-5'-P-CCNC: 21 U/L (ref 0–70)
ANION GAP SERPL CALCULATED.3IONS-SCNC: 8 MMOL/L (ref 7–15)
AST SERPL W P-5'-P-CCNC: 24 U/L (ref 0–45)
BASOPHILS # BLD AUTO: 0 10E3/UL (ref 0–0.2)
BASOPHILS NFR BLD AUTO: 1 %
BILIRUB SERPL-MCNC: 0.4 MG/DL
BUN SERPL-MCNC: 23.1 MG/DL (ref 6–20)
CALCIUM SERPL-MCNC: 9.5 MG/DL (ref 8.8–10.4)
CHLORIDE SERPL-SCNC: 102 MMOL/L (ref 98–107)
CHOLEST SERPL-MCNC: 277 MG/DL
CREAT SERPL-MCNC: 0.98 MG/DL (ref 0.67–1.17)
EGFRCR SERPLBLD CKD-EPI 2021: >90 ML/MIN/1.73M2
EOSINOPHIL # BLD AUTO: 0.1 10E3/UL (ref 0–0.7)
EOSINOPHIL NFR BLD AUTO: 1 %
ERYTHROCYTE [DISTWIDTH] IN BLOOD BY AUTOMATED COUNT: 12.1 % (ref 10–15)
FASTING STATUS PATIENT QL REPORTED: NO
FASTING STATUS PATIENT QL REPORTED: NO
GLUCOSE SERPL-MCNC: 104 MG/DL (ref 70–99)
HCO3 SERPL-SCNC: 29 MMOL/L (ref 22–29)
HCT VFR BLD AUTO: 45.4 % (ref 40–53)
HDLC SERPL-MCNC: 41 MG/DL
HGB BLD-MCNC: 15.1 G/DL (ref 13.3–17.7)
IMM GRANULOCYTES # BLD: 0 10E3/UL
IMM GRANULOCYTES NFR BLD: 0 %
LDLC SERPL CALC-MCNC: 184 MG/DL
LYMPHOCYTES # BLD AUTO: 1.9 10E3/UL (ref 0.8–5.3)
LYMPHOCYTES NFR BLD AUTO: 30 %
MCH RBC QN AUTO: 30.4 PG (ref 26.5–33)
MCHC RBC AUTO-ENTMCNC: 33.3 G/DL (ref 31.5–36.5)
MCV RBC AUTO: 92 FL (ref 78–100)
MONOCYTES # BLD AUTO: 0.6 10E3/UL (ref 0–1.3)
MONOCYTES NFR BLD AUTO: 10 %
NEUTROPHILS # BLD AUTO: 3.5 10E3/UL (ref 1.6–8.3)
NEUTROPHILS NFR BLD AUTO: 58 %
NONHDLC SERPL-MCNC: 236 MG/DL
NRBC # BLD AUTO: 0 10E3/UL
NRBC BLD AUTO-RTO: 0 /100
PLATELET # BLD AUTO: 200 10E3/UL (ref 150–450)
POTASSIUM SERPL-SCNC: 4.4 MMOL/L (ref 3.4–5.3)
PROT SERPL-MCNC: 7.2 G/DL (ref 6.4–8.3)
PSA SERPL DL<=0.01 NG/ML-MCNC: 1.22 NG/ML (ref 0–3.5)
RBC # BLD AUTO: 4.96 10E6/UL (ref 4.4–5.9)
SODIUM SERPL-SCNC: 139 MMOL/L (ref 135–145)
TRIGL SERPL-MCNC: 260 MG/DL
TSH SERPL DL<=0.005 MIU/L-ACNC: 2.47 UIU/ML (ref 0.3–4.2)
WBC # BLD AUTO: 6.1 10E3/UL (ref 4–11)

## 2024-11-21 PROCEDURE — 80053 COMPREHEN METABOLIC PANEL: CPT | Mod: ZL | Performed by: INTERNAL MEDICINE

## 2024-11-21 PROCEDURE — 84443 ASSAY THYROID STIM HORMONE: CPT | Mod: ZL | Performed by: INTERNAL MEDICINE

## 2024-11-21 PROCEDURE — 36415 COLL VENOUS BLD VENIPUNCTURE: CPT | Mod: ZL | Performed by: INTERNAL MEDICINE

## 2024-11-21 PROCEDURE — 85004 AUTOMATED DIFF WBC COUNT: CPT | Mod: ZL | Performed by: INTERNAL MEDICINE

## 2024-11-21 PROCEDURE — 85048 AUTOMATED LEUKOCYTE COUNT: CPT | Mod: ZL | Performed by: INTERNAL MEDICINE

## 2024-11-21 PROCEDURE — 82040 ASSAY OF SERUM ALBUMIN: CPT | Mod: ZL | Performed by: INTERNAL MEDICINE

## 2024-11-21 PROCEDURE — G0103 PSA SCREENING: HCPCS | Mod: ZL | Performed by: INTERNAL MEDICINE

## 2024-11-21 PROCEDURE — 80061 LIPID PANEL: CPT | Mod: ZL | Performed by: INTERNAL MEDICINE

## 2024-11-21 PROCEDURE — 82310 ASSAY OF CALCIUM: CPT | Mod: ZL | Performed by: INTERNAL MEDICINE

## 2024-11-21 RX ORDER — DOXYCYCLINE HYCLATE 100 MG
TABLET ORAL
Qty: 40 TABLET | Refills: 1 | Status: SHIPPED | OUTPATIENT
Start: 2024-11-21

## 2024-11-21 RX ORDER — LEVOTHYROXINE SODIUM 112 UG/1
112 TABLET ORAL DAILY
Qty: 90 TABLET | Refills: 4 | Status: SHIPPED | OUTPATIENT
Start: 2024-11-21

## 2024-11-21 RX ORDER — ATORVASTATIN CALCIUM 10 MG/1
10 TABLET, FILM COATED ORAL DAILY
Qty: 90 TABLET | Refills: 4 | Status: SHIPPED | OUTPATIENT
Start: 2024-11-21

## 2024-11-21 SDOH — HEALTH STABILITY: PHYSICAL HEALTH: ON AVERAGE, HOW MANY DAYS PER WEEK DO YOU ENGAGE IN MODERATE TO STRENUOUS EXERCISE (LIKE A BRISK WALK)?: 3 DAYS

## 2024-11-21 ASSESSMENT — SOCIAL DETERMINANTS OF HEALTH (SDOH): HOW OFTEN DO YOU GET TOGETHER WITH FRIENDS OR RELATIVES?: ONCE A WEEK

## 2024-11-21 ASSESSMENT — ENCOUNTER SYMPTOMS
FEVER: 0
AGITATION: 0
LIGHT-HEADEDNESS: 0
VOMITING: 0
COUGH: 0
CONFUSION: 0
MYALGIAS: 0
WOUND: 0
DIZZINESS: 0
WHEEZING: 0
ABDOMINAL PAIN: 0
BRUISES/BLEEDS EASILY: 0
SHORTNESS OF BREATH: 0
HEMATURIA: 0
DIARRHEA: 0
NAUSEA: 0
DYSURIA: 0
ARTHRALGIAS: 0
CHILLS: 0

## 2024-11-21 ASSESSMENT — PAIN SCALES - GENERAL: PAINLEVEL_OUTOF10: NO PAIN (0)

## 2024-11-21 NOTE — NURSING NOTE
"Chief Complaint   Patient presents with    Thyroid Problem   Medication check    Initial BP (!) 146/83 (BP Location: Right arm, Patient Position: Sitting, Cuff Size: Adult Large)   Pulse 72   Temp 97.1  F (36.2  C) (Tympanic)   Resp 12   Ht 1.676 m (5' 6\")   Wt 80.6 kg (177 lb 12.8 oz)   SpO2 98%   BMI 28.70 kg/m   Estimated body mass index is 28.7 kg/m  as calculated from the following:    Height as of this encounter: 1.676 m (5' 6\").    Weight as of this encounter: 80.6 kg (177 lb 12.8 oz).  Medication Review: complete    The next two questions are to help us understand your food security.  If you are feeling you need any assistance in this area, we have resources available to support you today.          11/21/2024   SDOH- Food Insecurity   Within the past 12 months, did you worry that your food would run out before you got money to buy more? N    Within the past 12 months, did the food you bought just not last and you didn t have money to get more? N        Patient-reported         Health Care Directive:  Patient has a Health Care Directive on file      Isabelle Lua      "

## 2024-11-21 NOTE — PATIENT INSTRUCTIONS
Blood pressure is well controlled.     Restart Cholesterol medication for heart attack / cholesterol reduction.     Medications refilled.   Labs are pending.       Return in approximately 1 year, or sooner as needed for follow-up with Dr. You.  - Annual Follow-up / Physical    Clinic : [phone]  Appointment line: [phone]

## 2024-11-21 NOTE — PROGRESS NOTES
"Assessment & Plan     ICD-10-CM    1. Annual physical exam  Z00.00       2. Vaccine counseling  Z71.85       3. Hypothyroidism due to acquired atrophy of thyroid  E03.4 levothyroxine (SYNTHROID/LEVOTHROID) 112 MCG tablet     CBC with Platelets & Differential     Comprehensive metabolic panel     TSH with free T4 reflex      4. Familial hyperlipidemia  E78.49 atorvastatin (LIPITOR) 10 MG tablet     Lipid Profile      5. Family history of heart disease in male family member before age 55  Z82.49       6. At high risk for tick borne illness  Z91.89 doxycycline hyclate (VIBRA-TABS) 100 MG tablet      7. Encounter for screening for malignant neoplasm of prostate  Z12.5 Prostate Specific Antigen Screen         Patient presents for ***    ***    {[facility] Chronic Problems List:938431}     ***    {[facility] 2021 Documentation (Optional):071351}  {2021 E&M time (Optional):811463}     BMI  Estimated body mass index is 28.7 kg/m  as calculated from the following:    Height as of this encounter: 1.676 m (5' 6\").    Weight as of this encounter: 80.6 kg (177 lb 12.8 oz).   {Weight Management Plan needed for ACO:578308}    Counseling  Appropriate preventive services were addressed with this patient via screening, questionnaire, or discussion as appropriate for fall prevention, nutrition, physical activity, Tobacco-use cessation, social engagement, weight loss and cognition.  Checklist reviewing preventive services available has been given to the patient.  Reviewed patient's diet, addressing concerns and/or questions.   He is at risk for lack of exercise and has been provided with information to increase physical activity for the benefit of his well-being.     {FOLLOW UP PLANS (Optional) Includes COVID19 Treatment Plan:197115}    Return in about 1 year (around 11/21/2025) for Annual Physical Exam.    Review of Systems   Constitutional:  Negative for chills and fever.   HENT:  Negative for congestion and hearing loss.    Eyes:  Negative for " visual disturbance.   Respiratory:  Negative for cough, shortness of breath and wheezing.    Cardiovascular:  Negative for chest pain.   Gastrointestinal:  Negative for abdominal pain, diarrhea, nausea and vomiting.   Endocrine: Negative for cold intolerance and heat intolerance.   Genitourinary:  Negative for dysuria and hematuria.   Musculoskeletal:  Negative for arthralgias and myalgias.   Skin:  Negative for rash and wound.   Allergic/Immunologic: Negative for immunocompromised state.   Neurological:  Negative for dizziness and light-headedness.   Hematological:  Does not bruise/bleed easily.   Psychiatric/Behavioral:  Negative for agitation and confusion.        Preventive Care Visit  [facility]  Prieto You MD, Internal Medicine  Nov 21, 2024  {Provider  Link to [employer] :841189}      Thomas Nevarez is a 54 year old, presenting for the following:  Thyroid Problem        11/21/2024     2:29 PM   Additional Questions   Roomed by Isabelle WREN   Accompanied by maggie          History of Present Illness       Hyperlipidemia:  He presents for follow up of hyperlipidemia.   He is taking medication to lower cholesterol. He is not having myalgia or other side effects to statin medications.    Hypothyroidism:     Since last visit, patient describes the following symptoms::  None    He eats 2-3 servings of fruits and vegetables daily.He consumes 0 sweetened beverage(s) daily.He exercises with enough effort to increase his heart rate 20 to 29 minutes per day.  He exercises with enough effort to increase his heart rate 3 or less days per week.   He is taking medications regularly.      {MA/LPN/RN Pre-Provider Visit Orders- hCG/UA/Strep (Optional):142163}  {SUPERLIST (Optional):944952}  {additonal problems for provider to add (Optional):253853}  Health Care Directive  Patient has a Health Care Directive on file  Advance care planning document is on file and is current.      11/21/2024   General  Health   How would you rate your overall physical health? Good   Feel stress (tense, anxious, or unable to sleep) Only a little      (!) STRESS CONCERN      11/21/2024   Nutrition   Three or more servings of calcium each day? Yes   Diet: Regular (no restrictions)   How many servings of fruit and vegetables per day? (!) 2-3   How many sweetened beverages each day? 0-1            11/21/2024   Exercise   Days per week of moderate/strenous exercise 3 days            11/21/2024   Social Factors   Frequency of gathering with friends or relatives Once a week   Worry food won't last until get money to buy more No   Food not last or not have enough money for food? No   Do you have housing? (Housing is defined as stable permanent housing and does not include staying ouside in a car, in a tent, in an abandoned building, in an overnight shelter, or couch-surfing.) Yes   Are you worried about losing your housing? No   Lack of transportation? No   Unable to get utilities (heat,electricity)? No            11/21/2024   Fall Risk   Fallen 2 or more times in the past year? No    Trouble with walking or balance? No        Patient-reported          11/21/2024   Dental   Dentist two times every year? Yes            11/21/2024   TB Screening   Were you born outside of the [place]? No            Today's PHQ-2 Score:       11/21/2024     2:23 PM   PHQ-2 ( 1999 [employer])   Q1: Little interest or pleasure in doing things 0    Q2: Feeling down, depressed or hopeless 0    PHQ-2 Score 0    Q1: Little interest or pleasure in doing things Not at all   Q2: Feeling down, depressed or hopeless Not at all   PHQ-2 Score 0       Patient-reported           11/21/2024   Substance Use   Alcohol more than 3/day or more than 7/wk No   Do you use any other substances recreationally? No        Social History     Tobacco Use    Smoking status: Never    Smokeless tobacco: Current     Types: Chew    Tobacco comments:     2 tins per week   Vaping Use    Vaping status:  "Never Used   Substance Use Topics    Alcohol use: Yes     Alcohol/week: 4.0 standard drinks of alcohol     Comment: occasional    Drug use: No     {Provider  If there are gaps in the social history shown above, please follow the link to update and then refresh the note Link to Social and Substance History :002999}      11/21/2024   STI Screening   New sexual partner(s) since last STI/HIV test? No      Last PSA:   Prostate Specific Antigen Screen   Date Value Ref Range Status   09/12/2023 1.34 0.00 - 3.50 ng/mL Final     PSA Tumor Marker   Date Value Ref Range Status   04/07/2022 0.95 0.00 - 4.00 ug/L Final     ASCVD Risk   The 10-year ASCVD risk score (Manju BONNER, et al., 2019) is: 10.3%    Values used to calculate the score:      Age: 54 years      Sex: Male      Is Non- : No      Diabetic: No      Tobacco smoker: No      Systolic Blood Pressure: 134 mmHg      Is BP treated: No      HDL Cholesterol: 41 mg/dL      Total Cholesterol: 258 mg/dL    {Link to Fracture Risk Assessment Tool (Optional):945797}    {Provider  REQUIRED FOR AWV Use the storyboard to review patient history, after sections have been marked as reviewed, refresh note to capture documentation:736121}   Reviewed and updated as needed this visit by Provider   Tobacco  Allergies  Meds  Problems  Med Hx  Surg Hx  Fam Hx     Sexual Activity          {HISTORY OPTIONS (Optional):716110}    {ROS Picklists (Optional):784950}     Objective    Exam  /79 (BP Location: Right arm, Patient Position: Sitting, Cuff Size: Adult Regular)   Pulse 72   Temp 97.1  F (36.2  C) (Tympanic)   Resp 12   Ht 1.676 m (5' 6\")   Wt 80.6 kg (177 lb 12.8 oz)   SpO2 98%   BMI 28.70 kg/m     Estimated body mass index is 28.7 kg/m  as calculated from the following:    Height as of this encounter: 1.676 m (5' 6\").    Weight as of this encounter: 80.6 kg (177 lb 12.8 oz).    Physical Exam  Constitutional:       General: He is not in " acute distress.     Appearance: He is well-developed. He is not diaphoretic.   Eyes:      General: No scleral icterus.     Conjunctiva/sclera: Conjunctivae normal.   Neck:      Vascular: No carotid bruit.   Cardiovascular:      Rate and Rhythm: Normal rate and regular rhythm.      Pulses: Normal pulses.   Pulmonary:      Effort: Pulmonary effort is normal.      Breath sounds: Normal breath sounds.   Abdominal:      Palpations: Abdomen is soft.      Tenderness: There is no abdominal tenderness.   Musculoskeletal:         General: Deformity (Dupuytren's contracture of both hands) present.      Cervical back: Neck supple.      Right lower leg: No edema.      Left lower leg: No edema.      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   Skin:     General: Skin is warm and dry.      Findings: No rash.   Neurological:      Mental Status: He is alert and oriented to person, place, and time. Mental status is at baseline.   Psychiatric:         Mood and Affect: Mood normal.         Behavior: Behavior normal.       {Exam Choices (Optional):884908}        Signed Electronically by: Prieto You MD  {Email feedback regarding this note to [email or web address]   :126230}     Social Factors   Frequency of gathering with friends or relatives Once a week   Worry food won't last until get money to buy more No   Food not last or not have enough money for food? No   Do you have housing? (Housing is defined as stable permanent housing and does not include staying ouside in a car, in a tent, in an abandoned building, in an overnight shelter, or couch-surfing.) Yes   Are you worried about losing your housing? No   Lack of transportation? No   Unable to get utilities (heat,electricity)? No            11/21/2024   Fall Risk   Fallen 2 or more times in the past year? No    Trouble with walking or balance? No        Patient-reported          11/21/2024   Dental   Dentist two times every year? Yes            11/21/2024   TB Screening   Were you born outside of the [place]? No            Today's PHQ-2 Score:       11/21/2024     2:23 PM   PHQ-2 ( 1999 [employer])   Q1: Little interest or pleasure in doing things 0    Q2: Feeling down, depressed or hopeless 0    PHQ-2 Score 0    Q1: Little interest or pleasure in doing things Not at all   Q2: Feeling down, depressed or hopeless Not at all   PHQ-2 Score 0       Patient-reported           11/21/2024   Substance Use   Alcohol more than 3/day or more than 7/wk No   Do you use any other substances recreationally? No        Social History     Tobacco Use    Smoking status: Never    Smokeless tobacco: Current     Types: Chew    Tobacco comments:     2 tins per week   Vaping Use    Vaping status: Never Used   Substance Use Topics    Alcohol use: Yes     Alcohol/week: 4.0 standard drinks of alcohol     Comment: occasional    Drug use: No           11/21/2024   STI Screening   New sexual partner(s) since last STI/HIV test? No      Last PSA:   Prostate Specific Antigen Screen   Date Value Ref Range Status   11/21/2024 1.22 0.00 - 3.50 ng/mL Final     PSA Tumor Marker   Date Value Ref Range Status   04/07/2022 0.95 0.00 - 4.00 ug/L Final     ASCVD Risk   The  "10-year ASCVD risk score (Manju BONNER, et al., 2019) is: 11.1%    Values used to calculate the score:      Age: 54 years      Sex: Male      Is Non- : No      Diabetic: No      Tobacco smoker: No      Systolic Blood Pressure: 134 mmHg      Is BP treated: No      HDL Cholesterol: 41 mg/dL      Total Cholesterol: 277 mg/dL           Reviewed and updated as needed this visit by Provider   Tobacco  Allergies  Meds  Problems  Med Hx  Surg Hx  Fam Hx     Sexual Activity                   Objective    Exam  /79 (BP Location: Right arm, Patient Position: Sitting, Cuff Size: Adult Regular)   Pulse 72   Temp 97.1  F (36.2  C) (Tympanic)   Resp 12   Ht 1.676 m (5' 6\")   Wt 80.6 kg (177 lb 12.8 oz)   SpO2 98%   BMI 28.70 kg/m     Estimated body mass index is 28.7 kg/m  as calculated from the following:    Height as of this encounter: 1.676 m (5' 6\").    Weight as of this encounter: 80.6 kg (177 lb 12.8 oz).    Physical Exam  Constitutional:       General: He is not in acute distress.     Appearance: He is well-developed. He is not diaphoretic.   Eyes:      General: No scleral icterus.     Conjunctiva/sclera: Conjunctivae normal.   Neck:      Vascular: No carotid bruit.   Cardiovascular:      Rate and Rhythm: Normal rate and regular rhythm.      Pulses: Normal pulses.   Pulmonary:      Effort: Pulmonary effort is normal.      Breath sounds: Normal breath sounds.   Abdominal:      Palpations: Abdomen is soft.      Tenderness: There is no abdominal tenderness.   Musculoskeletal:         General: Deformity (Dupuytren's contracture of both hands) present.      Cervical back: Neck supple.      Right lower leg: No edema.      Left lower leg: No edema.      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   Skin:     General: Skin is warm and dry.      Findings: No rash.   Neurological:      Mental Status: He is alert and oriented to person, place, and time. " Mental status is at baseline.   Psychiatric:         Mood and Affect: Mood normal.         Behavior: Behavior normal.               Signed Electronically by: Prieto You MD

## 2024-11-23 PROBLEM — E78.49 FAMILIAL HYPERLIPIDEMIA: Status: ACTIVE | Noted: 2024-11-23

## 2025-07-07 ENCOUNTER — LAB (OUTPATIENT)
Dept: LAB | Facility: OTHER | Age: 55
End: 2025-07-07
Attending: INTERNAL MEDICINE
Payer: COMMERCIAL

## 2025-07-07 ENCOUNTER — RESULTS FOLLOW-UP (OUTPATIENT)
Dept: INTERNAL MEDICINE | Facility: OTHER | Age: 55
End: 2025-07-07

## 2025-07-07 DIAGNOSIS — E78.49 FAMILIAL HYPERLIPIDEMIA: ICD-10-CM

## 2025-07-07 DIAGNOSIS — Z82.49 FAMILY HISTORY OF HEART DISEASE IN MALE FAMILY MEMBER BEFORE AGE 55: Primary | ICD-10-CM

## 2025-07-07 DIAGNOSIS — Z13.6 SCREENING FOR HEART DISEASE: ICD-10-CM

## 2025-07-07 LAB
CHOLEST SERPL-MCNC: 239 MG/DL
FASTING STATUS PATIENT QL REPORTED: YES
HDLC SERPL-MCNC: 43 MG/DL
LDLC SERPL CALC-MCNC: 174 MG/DL
NONHDLC SERPL-MCNC: 196 MG/DL
TRIGL SERPL-MCNC: 109 MG/DL

## 2025-07-07 PROCEDURE — 36415 COLL VENOUS BLD VENIPUNCTURE: CPT | Mod: ZL

## 2025-07-07 PROCEDURE — 82465 ASSAY BLD/SERUM CHOLESTEROL: CPT | Mod: ZL

## 2025-07-08 NOTE — TELEPHONE ENCOUNTER
Requesting CT calcium screen done. Would you be willing to put an order in for him?     Jamal'd up order.     Routing to provider to review and respond.  Phan Bates RN on 7/8/2025 at 4:24 PM

## 2025-07-22 ENCOUNTER — HOSPITAL ENCOUNTER (OUTPATIENT)
Dept: CT IMAGING | Facility: OTHER | Age: 55
Discharge: HOME | End: 2025-07-22
Payer: COMMERCIAL

## 2025-07-22 PROCEDURE — 75571 CT HRT W/O DYE W/CA TEST: CPT

## 2025-07-22 PROCEDURE — 75571 CT HRT W/O DYE W/CA TEST: CPT | Mod: 26 | Performed by: RADIOLOGY

## (undated) DEVICE — SUCTION MANIFOLD NEPTUNE 2 SYS 4 PORT 0702-020-000

## (undated) DEVICE — TOURNIQUET SGL BLADDER 18"X4" RED 5921-218-135

## (undated) DEVICE — DRSG GAUZE 4X4" TRAY 6939

## (undated) DEVICE — TUBING SUCTION 10'X3/16" N510

## (undated) DEVICE — DRSG GAUZE 4X4" 3033

## (undated) DEVICE — DRSG XEROFORM 1X8"

## (undated) DEVICE — IMM ALUMI HAND XLG 761

## (undated) DEVICE — ENDO BRUSH CHANNEL MASTER CLEANING 2-4.2MM BW-412T

## (undated) DEVICE — PACK MAJOR EXTREMITY SOP15MEFCA

## (undated) DEVICE — ENDO SNARE EXACTO COLD 9MM LOOP 2.4MMX230CM 00711115

## (undated) DEVICE — SOL WATER 1500ML

## (undated) DEVICE — ESU GROUND PAD ADULT W/CORD E7507

## (undated) DEVICE — PREP CHLORAPREP 26ML TINTED ORANGE  260815

## (undated) DEVICE — SU ETHILON 4-0 FS-2 18" 662H

## (undated) DEVICE — BNDG KLING 2" 2231

## (undated) DEVICE — BNDG ELASTIC 2"X5YDS UNSTERILE 6611-20

## (undated) DEVICE — GLOVE SENSICARE 8.5 MSG1085 LATEX FREE

## (undated) DEVICE — CAST PADDING 2" UNSTERILE 9062

## (undated) DEVICE — GLOVE ESTEEM POWDER FREE SMT 8.5 2D72PT85

## (undated) DEVICE — ENDO KIT COMPLIANCE DYKENDOCMPLY

## (undated) DEVICE — COVER LIGHT HANDLE LT-F02

## (undated) DEVICE — DRAPE STERI TOWEL LG 1010

## (undated) RX ORDER — LIDOCAINE HYDROCHLORIDE 20 MG/ML
INJECTION, SOLUTION EPIDURAL; INFILTRATION; INTRACAUDAL; PERINEURAL
Status: DISPENSED
Start: 2022-04-22

## (undated) RX ORDER — PROPOFOL 10 MG/ML
INJECTION, EMULSION INTRAVENOUS
Status: DISPENSED
Start: 2022-04-22

## (undated) RX ORDER — KETOROLAC TROMETHAMINE 30 MG/ML
INJECTION, SOLUTION INTRAMUSCULAR; INTRAVENOUS
Status: DISPENSED
Start: 2022-04-22

## (undated) RX ORDER — FENTANYL CITRATE 50 UG/ML
INJECTION, SOLUTION INTRAMUSCULAR; INTRAVENOUS
Status: DISPENSED
Start: 2022-04-22

## (undated) RX ORDER — ONDANSETRON 2 MG/ML
INJECTION INTRAMUSCULAR; INTRAVENOUS
Status: DISPENSED
Start: 2022-04-22

## (undated) RX ORDER — LIDOCAINE HYDROCHLORIDE 10 MG/ML
INJECTION, SOLUTION INFILTRATION; PERINEURAL
Status: DISPENSED
Start: 2022-04-22

## (undated) RX ORDER — DEXAMETHASONE SODIUM PHOSPHATE 4 MG/ML
INJECTION, SOLUTION INTRA-ARTICULAR; INTRALESIONAL; INTRAMUSCULAR; INTRAVENOUS; SOFT TISSUE
Status: DISPENSED
Start: 2022-04-22

## (undated) RX ORDER — LIDOCAINE HYDROCHLORIDE 20 MG/ML
INJECTION, SOLUTION EPIDURAL; INFILTRATION; INTRACAUDAL; PERINEURAL
Status: DISPENSED
Start: 2022-04-12

## (undated) RX ORDER — PROPOFOL 10 MG/ML
INJECTION, EMULSION INTRAVENOUS
Status: DISPENSED
Start: 2022-04-12

## (undated) RX ORDER — BUPIVACAINE HYDROCHLORIDE 2.5 MG/ML
INJECTION, SOLUTION EPIDURAL; INFILTRATION; INTRACAUDAL
Status: DISPENSED
Start: 2022-04-22